# Patient Record
Sex: MALE | Race: WHITE | NOT HISPANIC OR LATINO | Employment: OTHER | URBAN - METROPOLITAN AREA
[De-identification: names, ages, dates, MRNs, and addresses within clinical notes are randomized per-mention and may not be internally consistent; named-entity substitution may affect disease eponyms.]

---

## 2018-01-15 ENCOUNTER — COMMUNICATION - HEALTHEAST (OUTPATIENT)
Dept: TELEHEALTH | Facility: CLINIC | Age: 38
End: 2018-01-15

## 2018-01-15 ENCOUNTER — OFFICE VISIT - HEALTHEAST (OUTPATIENT)
Dept: FAMILY MEDICINE | Facility: CLINIC | Age: 38
End: 2018-01-15

## 2018-01-15 DIAGNOSIS — S06.0X0A CONCUSSION WITHOUT LOSS OF CONSCIOUSNESS, INITIAL ENCOUNTER: ICD-10-CM

## 2018-01-15 ASSESSMENT — MIFFLIN-ST. JEOR: SCORE: 1805.33

## 2018-03-30 ENCOUNTER — OFFICE VISIT - HEALTHEAST (OUTPATIENT)
Dept: FAMILY MEDICINE | Facility: CLINIC | Age: 38
End: 2018-03-30

## 2018-03-30 DIAGNOSIS — F41.9 ANXIETY DISORDER, UNSPECIFIED TYPE: ICD-10-CM

## 2018-03-30 DIAGNOSIS — F10.29 ALCOHOL DEPENDENCE WITH UNSPECIFIED ALCOHOL-INDUCED DISORDER (H): ICD-10-CM

## 2018-03-30 LAB
ALBUMIN SERPL-MCNC: 4.2 G/DL (ref 3.5–5)
ALP SERPL-CCNC: 47 U/L (ref 45–120)
ALT SERPL W P-5'-P-CCNC: 57 U/L (ref 0–45)
AST SERPL W P-5'-P-CCNC: 35 U/L (ref 0–40)
BILIRUB DIRECT SERPL-MCNC: 0.2 MG/DL
BILIRUB SERPL-MCNC: 0.6 MG/DL (ref 0–1)
PROT SERPL-MCNC: 7.3 G/DL (ref 6–8)

## 2018-04-03 ENCOUNTER — COMMUNICATION - HEALTHEAST (OUTPATIENT)
Dept: FAMILY MEDICINE | Facility: CLINIC | Age: 38
End: 2018-04-03

## 2018-04-11 ENCOUNTER — HOSPITAL ENCOUNTER (OUTPATIENT)
Dept: BEHAVIORAL HEALTH | Facility: CLINIC | Age: 38
Discharge: HOME | End: 2018-04-11
Attending: SOCIAL WORKER | Admitting: SOCIAL WORKER
Payer: COMMERCIAL

## 2018-04-11 VITALS
HEART RATE: 56 BPM | HEIGHT: 71 IN | SYSTOLIC BLOOD PRESSURE: 148 MMHG | BODY MASS INDEX: 26.01 KG/M2 | WEIGHT: 185.8 LBS | DIASTOLIC BLOOD PRESSURE: 90 MMHG

## 2018-04-11 PROCEDURE — H0001 ALCOHOL AND/OR DRUG ASSESS: HCPCS

## 2018-04-11 RX ORDER — CITALOPRAM HYDROBROMIDE 10 MG/1
TABLET ORAL
COMMUNITY
Start: 2018-03-30 | End: 2021-11-03

## 2018-04-11 ASSESSMENT — ANXIETY QUESTIONNAIRES
5. BEING SO RESTLESS THAT IT IS HARD TO SIT STILL: NOT AT ALL
GAD7 TOTAL SCORE: 2
1. FEELING NERVOUS, ANXIOUS, OR ON EDGE: SEVERAL DAYS
4. TROUBLE RELAXING: NOT AT ALL
2. NOT BEING ABLE TO STOP OR CONTROL WORRYING: NOT AT ALL
7. FEELING AFRAID AS IF SOMETHING AWFUL MIGHT HAPPEN: NOT AT ALL
3. WORRYING TOO MUCH ABOUT DIFFERENT THINGS: NOT AT ALL
6. BECOMING EASILY ANNOYED OR IRRITABLE: SEVERAL DAYS

## 2018-04-11 ASSESSMENT — PAIN SCALES - GENERAL: PAINLEVEL: NO PAIN (0)

## 2018-04-12 ASSESSMENT — ANXIETY QUESTIONNAIRES: GAD7 TOTAL SCORE: 2

## 2018-04-12 ASSESSMENT — PATIENT HEALTH QUESTIONNAIRE - PHQ9: SUM OF ALL RESPONSES TO PHQ QUESTIONS 1-9: 2

## 2018-04-30 ENCOUNTER — COMMUNICATION - HEALTHEAST (OUTPATIENT)
Dept: FAMILY MEDICINE | Facility: CLINIC | Age: 38
End: 2018-04-30

## 2018-04-30 DIAGNOSIS — F41.9 ANXIETY DISORDER, UNSPECIFIED TYPE: ICD-10-CM

## 2018-05-04 ENCOUNTER — OFFICE VISIT - HEALTHEAST (OUTPATIENT)
Dept: FAMILY MEDICINE | Facility: CLINIC | Age: 38
End: 2018-05-04

## 2018-05-04 DIAGNOSIS — F41.9 ANXIETY DISORDER, UNSPECIFIED TYPE: ICD-10-CM

## 2018-05-04 DIAGNOSIS — F10.29 ALCOHOL DEPENDENCE WITH UNSPECIFIED ALCOHOL-INDUCED DISORDER (H): ICD-10-CM

## 2018-09-14 ENCOUNTER — AMBULATORY - HEALTHEAST (OUTPATIENT)
Dept: LAB | Facility: CLINIC | Age: 38
End: 2018-09-14

## 2018-09-14 ENCOUNTER — AMBULATORY - HEALTHEAST (OUTPATIENT)
Dept: NURSING | Facility: CLINIC | Age: 38
End: 2018-09-14

## 2018-09-14 DIAGNOSIS — J30.2 SEASONAL ALLERGIC RHINITIS: ICD-10-CM

## 2018-09-14 DIAGNOSIS — Z11.3 SCREENING EXAMINATION FOR VENEREAL DISEASE: ICD-10-CM

## 2018-09-14 LAB
HBV CORE AB SERPL QL IA: NEGATIVE
HCV AB SERPL QL IA: NEGATIVE
HIV 1+2 AB+HIV1 P24 AG SERPL QL IA: NEGATIVE

## 2018-09-15 LAB — T PALLIDUM AB SER QL: NEGATIVE

## 2018-09-17 LAB — HEPATITIS B SURFACE ANTIBODY LHE- HISTORICAL: POSITIVE

## 2018-10-31 ENCOUNTER — OFFICE VISIT - HEALTHEAST (OUTPATIENT)
Dept: ADDICTION MEDICINE | Facility: CLINIC | Age: 38
End: 2018-10-31

## 2018-10-31 DIAGNOSIS — F10.20 MODERATE ALCOHOL USE DISORDER (H): ICD-10-CM

## 2018-11-14 ENCOUNTER — OFFICE VISIT - HEALTHEAST (OUTPATIENT)
Dept: ADDICTION MEDICINE | Facility: CLINIC | Age: 38
End: 2018-11-14

## 2018-11-14 DIAGNOSIS — F10.20 MODERATE ALCOHOL USE DISORDER (H): ICD-10-CM

## 2018-11-15 ENCOUNTER — OFFICE VISIT - HEALTHEAST (OUTPATIENT)
Dept: ADDICTION MEDICINE | Facility: CLINIC | Age: 38
End: 2018-11-15

## 2018-11-15 ENCOUNTER — AMBULATORY - HEALTHEAST (OUTPATIENT)
Dept: ADDICTION MEDICINE | Facility: CLINIC | Age: 38
End: 2018-11-15

## 2018-11-15 DIAGNOSIS — F10.20 MODERATE ALCOHOL USE DISORDER (H): ICD-10-CM

## 2018-11-16 ENCOUNTER — AMBULATORY - HEALTHEAST (OUTPATIENT)
Dept: ADDICTION MEDICINE | Facility: CLINIC | Age: 38
End: 2018-11-16

## 2018-11-19 ENCOUNTER — OFFICE VISIT - HEALTHEAST (OUTPATIENT)
Dept: ADDICTION MEDICINE | Facility: CLINIC | Age: 38
End: 2018-11-19

## 2018-11-19 DIAGNOSIS — F10.20 MODERATE ALCOHOL USE DISORDER (H): ICD-10-CM

## 2018-11-21 ENCOUNTER — OFFICE VISIT - HEALTHEAST (OUTPATIENT)
Dept: ADDICTION MEDICINE | Facility: CLINIC | Age: 38
End: 2018-11-21

## 2018-11-21 DIAGNOSIS — F10.20 MODERATE ALCOHOL USE DISORDER (H): ICD-10-CM

## 2018-11-23 ENCOUNTER — AMBULATORY - HEALTHEAST (OUTPATIENT)
Dept: ADDICTION MEDICINE | Facility: CLINIC | Age: 38
End: 2018-11-23

## 2018-11-27 ENCOUNTER — OFFICE VISIT - HEALTHEAST (OUTPATIENT)
Dept: ADDICTION MEDICINE | Facility: CLINIC | Age: 38
End: 2018-11-27

## 2018-11-27 DIAGNOSIS — F10.20 MODERATE ALCOHOL USE DISORDER (H): ICD-10-CM

## 2018-11-28 ENCOUNTER — OFFICE VISIT - HEALTHEAST (OUTPATIENT)
Dept: ADDICTION MEDICINE | Facility: CLINIC | Age: 38
End: 2018-11-28

## 2018-11-28 DIAGNOSIS — F10.20 MODERATE ALCOHOL USE DISORDER (H): ICD-10-CM

## 2018-11-29 ENCOUNTER — OFFICE VISIT - HEALTHEAST (OUTPATIENT)
Dept: ADDICTION MEDICINE | Facility: CLINIC | Age: 38
End: 2018-11-29

## 2018-11-29 DIAGNOSIS — F10.20 MODERATE ALCOHOL USE DISORDER (H): ICD-10-CM

## 2018-12-02 ENCOUNTER — AMBULATORY - HEALTHEAST (OUTPATIENT)
Dept: ADDICTION MEDICINE | Facility: CLINIC | Age: 38
End: 2018-12-02

## 2018-12-04 ENCOUNTER — COMMUNICATION - HEALTHEAST (OUTPATIENT)
Dept: FAMILY MEDICINE | Facility: CLINIC | Age: 38
End: 2018-12-04

## 2018-12-04 ENCOUNTER — OFFICE VISIT - HEALTHEAST (OUTPATIENT)
Dept: ADDICTION MEDICINE | Facility: CLINIC | Age: 38
End: 2018-12-04

## 2018-12-04 DIAGNOSIS — F10.20 MODERATE ALCOHOL USE DISORDER (H): ICD-10-CM

## 2018-12-04 DIAGNOSIS — F10.29 ALCOHOL DEPENDENCE WITH UNSPECIFIED ALCOHOL-INDUCED DISORDER (H): ICD-10-CM

## 2018-12-05 ENCOUNTER — OFFICE VISIT - HEALTHEAST (OUTPATIENT)
Dept: ADDICTION MEDICINE | Facility: CLINIC | Age: 38
End: 2018-12-05

## 2018-12-05 DIAGNOSIS — F10.20 MODERATE ALCOHOL USE DISORDER (H): ICD-10-CM

## 2018-12-06 ENCOUNTER — OFFICE VISIT - HEALTHEAST (OUTPATIENT)
Dept: ADDICTION MEDICINE | Facility: CLINIC | Age: 38
End: 2018-12-06

## 2018-12-06 DIAGNOSIS — F10.20 MODERATE ALCOHOL USE DISORDER (H): ICD-10-CM

## 2018-12-07 ENCOUNTER — TELEPHONE (OUTPATIENT)
Dept: ADDICTION MEDICINE | Facility: CLINIC | Age: 38
End: 2018-12-07

## 2018-12-07 NOTE — TELEPHONE ENCOUNTER
Referral received via fax from [facility] [facility].  Please review referral and route back to reception pool # [id number].  Referral placed on Dr. Overton's desk to review.      Hola Ziegler

## 2018-12-09 ENCOUNTER — AMBULATORY - HEALTHEAST (OUTPATIENT)
Dept: ADDICTION MEDICINE | Facility: CLINIC | Age: 38
End: 2018-12-09

## 2018-12-11 ENCOUNTER — OFFICE VISIT - HEALTHEAST (OUTPATIENT)
Dept: ADDICTION MEDICINE | Facility: CLINIC | Age: 38
End: 2018-12-11

## 2018-12-11 DIAGNOSIS — F10.20 MODERATE ALCOHOL USE DISORDER (H): ICD-10-CM

## 2018-12-11 NOTE — TELEPHONE ENCOUNTER
Writer attempted to reach pt; no answer. LVM requesting a call back for an appt. Two more attempts will be made.      Hola Ziegler

## 2018-12-12 ENCOUNTER — OFFICE VISIT - HEALTHEAST (OUTPATIENT)
Dept: ADDICTION MEDICINE | Facility: CLINIC | Age: 38
End: 2018-12-12

## 2018-12-12 DIAGNOSIS — F10.20 MODERATE ALCOHOL USE DISORDER (H): ICD-10-CM

## 2018-12-13 ENCOUNTER — OFFICE VISIT - HEALTHEAST (OUTPATIENT)
Dept: ADDICTION MEDICINE | Facility: CLINIC | Age: 38
End: 2018-12-13

## 2018-12-13 DIAGNOSIS — F10.20 MODERATE ALCOHOL USE DISORDER (H): ICD-10-CM

## 2018-12-14 ENCOUNTER — AMBULATORY - HEALTHEAST (OUTPATIENT)
Dept: ADDICTION MEDICINE | Facility: CLINIC | Age: 38
End: 2018-12-14

## 2018-12-18 NOTE — TELEPHONE ENCOUNTER
Second attempt to reach pt; no answer. LVM requesting a call back for an appt. A final attempt will be made.     Hola Ziegler

## 2018-12-19 ENCOUNTER — COMMUNICATION - HEALTHEAST (OUTPATIENT)
Dept: ADDICTION MEDICINE | Facility: CLINIC | Age: 38
End: 2018-12-19

## 2018-12-20 ENCOUNTER — OFFICE VISIT - HEALTHEAST (OUTPATIENT)
Dept: ADDICTION MEDICINE | Facility: CLINIC | Age: 38
End: 2018-12-20

## 2018-12-20 DIAGNOSIS — F10.20 MODERATE ALCOHOL USE DISORDER (H): ICD-10-CM

## 2018-12-21 ENCOUNTER — OFFICE VISIT - HEALTHEAST (OUTPATIENT)
Dept: FAMILY MEDICINE | Facility: CLINIC | Age: 38
End: 2018-12-21

## 2018-12-21 ENCOUNTER — OFFICE VISIT - HEALTHEAST (OUTPATIENT)
Dept: BEHAVIORAL HEALTH | Facility: CLINIC | Age: 38
End: 2018-12-21

## 2018-12-21 DIAGNOSIS — F10.29 ALCOHOL DEPENDENCE WITH UNSPECIFIED ALCOHOL-INDUCED DISORDER (H): ICD-10-CM

## 2018-12-21 DIAGNOSIS — F43.22 ADJUSTMENT DISORDER WITH ANXIOUS MOOD: ICD-10-CM

## 2018-12-21 DIAGNOSIS — F41.9 ANXIETY DISORDER, UNSPECIFIED TYPE: ICD-10-CM

## 2018-12-21 LAB
ALBUMIN SERPL-MCNC: 4.2 G/DL (ref 3.5–5)
ALP SERPL-CCNC: 43 U/L (ref 45–120)
ALT SERPL W P-5'-P-CCNC: 44 U/L (ref 0–45)
AST SERPL W P-5'-P-CCNC: 29 U/L (ref 0–40)
BILIRUB DIRECT SERPL-MCNC: 0.2 MG/DL
BILIRUB SERPL-MCNC: 0.5 MG/DL (ref 0–1)
PROT SERPL-MCNC: 7.2 G/DL (ref 6–8)

## 2018-12-26 ENCOUNTER — OFFICE VISIT - HEALTHEAST (OUTPATIENT)
Dept: ADDICTION MEDICINE | Facility: CLINIC | Age: 38
End: 2018-12-26

## 2018-12-26 DIAGNOSIS — F10.20 MODERATE ALCOHOL USE DISORDER (H): ICD-10-CM

## 2018-12-27 ENCOUNTER — OFFICE VISIT - HEALTHEAST (OUTPATIENT)
Dept: ADDICTION MEDICINE | Facility: CLINIC | Age: 38
End: 2018-12-27

## 2018-12-27 DIAGNOSIS — F10.20 MODERATE ALCOHOL USE DISORDER (H): ICD-10-CM

## 2018-12-31 ENCOUNTER — OFFICE VISIT - HEALTHEAST (OUTPATIENT)
Dept: ADDICTION MEDICINE | Facility: CLINIC | Age: 38
End: 2018-12-31

## 2018-12-31 ENCOUNTER — COMMUNICATION - HEALTHEAST (OUTPATIENT)
Dept: ADDICTION MEDICINE | Facility: CLINIC | Age: 38
End: 2018-12-31

## 2018-12-31 DIAGNOSIS — F10.20 MODERATE ALCOHOL USE DISORDER (H): ICD-10-CM

## 2019-01-02 ENCOUNTER — COMMUNICATION - HEALTHEAST (OUTPATIENT)
Dept: ADDICTION MEDICINE | Facility: CLINIC | Age: 39
End: 2019-01-02

## 2019-01-02 ENCOUNTER — OFFICE VISIT - HEALTHEAST (OUTPATIENT)
Dept: ADDICTION MEDICINE | Facility: CLINIC | Age: 39
End: 2019-01-02

## 2019-01-02 DIAGNOSIS — F10.20 MODERATE ALCOHOL USE DISORDER (H): ICD-10-CM

## 2019-01-03 ENCOUNTER — OFFICE VISIT - HEALTHEAST (OUTPATIENT)
Dept: ADDICTION MEDICINE | Facility: CLINIC | Age: 39
End: 2019-01-03

## 2019-01-03 DIAGNOSIS — F10.20 MODERATE ALCOHOL USE DISORDER (H): ICD-10-CM

## 2019-01-04 ENCOUNTER — AMBULATORY - HEALTHEAST (OUTPATIENT)
Dept: ADDICTION MEDICINE | Facility: CLINIC | Age: 39
End: 2019-01-04

## 2019-01-05 ENCOUNTER — AMBULATORY - HEALTHEAST (OUTPATIENT)
Dept: ADDICTION MEDICINE | Facility: CLINIC | Age: 39
End: 2019-01-05

## 2019-01-07 ENCOUNTER — COMMUNICATION - HEALTHEAST (OUTPATIENT)
Dept: ADDICTION MEDICINE | Facility: CLINIC | Age: 39
End: 2019-01-07

## 2019-01-08 ENCOUNTER — AMBULATORY - HEALTHEAST (OUTPATIENT)
Dept: ADDICTION MEDICINE | Facility: CLINIC | Age: 39
End: 2019-01-08

## 2019-04-26 ENCOUNTER — COMMUNICATION - HEALTHEAST (OUTPATIENT)
Dept: FAMILY MEDICINE | Facility: CLINIC | Age: 39
End: 2019-04-26

## 2019-04-26 DIAGNOSIS — F10.29 ALCOHOL DEPENDENCE WITH UNSPECIFIED ALCOHOL-INDUCED DISORDER (H): ICD-10-CM

## 2019-05-03 ENCOUNTER — OFFICE VISIT - HEALTHEAST (OUTPATIENT)
Dept: FAMILY MEDICINE | Facility: CLINIC | Age: 39
End: 2019-05-03

## 2019-05-03 DIAGNOSIS — F10.29 ALCOHOL DEPENDENCE WITH UNSPECIFIED ALCOHOL-INDUCED DISORDER (H): ICD-10-CM

## 2019-05-03 DIAGNOSIS — F41.9 ANXIETY DISORDER, UNSPECIFIED TYPE: ICD-10-CM

## 2019-05-03 LAB
ALBUMIN SERPL-MCNC: 4.3 G/DL (ref 3.5–5)
ALP SERPL-CCNC: 61 U/L (ref 45–120)
ALT SERPL W P-5'-P-CCNC: 45 U/L (ref 0–45)
AST SERPL W P-5'-P-CCNC: 24 U/L (ref 0–40)
BILIRUB DIRECT SERPL-MCNC: <0.1 MG/DL
BILIRUB SERPL-MCNC: 0.4 MG/DL (ref 0–1)
PROT SERPL-MCNC: 7.3 G/DL (ref 6–8)

## 2019-07-03 ENCOUNTER — AMBULATORY - HEALTHEAST (OUTPATIENT)
Dept: LAB | Facility: CLINIC | Age: 39
End: 2019-07-03

## 2019-07-03 DIAGNOSIS — Z11.3 SCREEN FOR SEXUALLY TRANSMITTED DISEASES: ICD-10-CM

## 2019-07-03 LAB — HIV 1+2 AB+HIV1 P24 AG SERPL QL IA: NEGATIVE

## 2019-07-04 LAB
HBV SURFACE AG SERPL QL IA: NEGATIVE
HCV AB SERPL QL IA: NEGATIVE
T PALLIDUM AB SER QL: NEGATIVE

## 2019-07-05 LAB — HBV CORE AB SERPL QL IA: NEGATIVE

## 2019-07-08 LAB — HTLV I+II AB SER QL IA: NEGATIVE

## 2019-12-05 ENCOUNTER — COMMUNICATION - HEALTHEAST (OUTPATIENT)
Dept: FAMILY MEDICINE | Facility: CLINIC | Age: 39
End: 2019-12-05

## 2019-12-05 DIAGNOSIS — F41.9 ANXIETY DISORDER, UNSPECIFIED TYPE: ICD-10-CM

## 2020-06-22 ENCOUNTER — OFFICE VISIT - HEALTHEAST (OUTPATIENT)
Dept: FAMILY MEDICINE | Facility: CLINIC | Age: 40
End: 2020-06-22

## 2020-06-22 ENCOUNTER — COMMUNICATION - HEALTHEAST (OUTPATIENT)
Dept: PEDIATRICS | Facility: CLINIC | Age: 40
End: 2020-06-22

## 2020-06-22 DIAGNOSIS — F41.9 ANXIETY DISORDER, UNSPECIFIED TYPE: ICD-10-CM

## 2020-06-22 ASSESSMENT — PATIENT HEALTH QUESTIONNAIRE - PHQ9: SUM OF ALL RESPONSES TO PHQ QUESTIONS 1-9: 2

## 2020-09-22 ENCOUNTER — AMBULATORY - HEALTHEAST (OUTPATIENT)
Dept: NURSING | Facility: CLINIC | Age: 40
End: 2020-09-22

## 2020-09-22 DIAGNOSIS — Z23 NEED FOR VACCINATION: ICD-10-CM

## 2020-10-22 ENCOUNTER — OFFICE VISIT - HEALTHEAST (OUTPATIENT)
Dept: FAMILY MEDICINE | Facility: CLINIC | Age: 40
End: 2020-10-22

## 2020-10-22 DIAGNOSIS — R41.840 ATTENTION AND CONCENTRATION DEFICIT: ICD-10-CM

## 2020-10-26 ENCOUNTER — OFFICE VISIT - HEALTHEAST (OUTPATIENT)
Dept: INTERNAL MEDICINE | Facility: CLINIC | Age: 40
End: 2020-10-26

## 2020-10-26 ENCOUNTER — COMMUNICATION - HEALTHEAST (OUTPATIENT)
Dept: INTERNAL MEDICINE | Facility: CLINIC | Age: 40
End: 2020-10-26

## 2020-10-26 DIAGNOSIS — Z76.89 ENCOUNTER TO ESTABLISH CARE: ICD-10-CM

## 2020-10-26 DIAGNOSIS — F90.9 ATTENTION DEFICIT HYPERACTIVITY DISORDER (ADHD), UNSPECIFIED ADHD TYPE: ICD-10-CM

## 2020-10-26 DIAGNOSIS — F41.9 ANXIETY: ICD-10-CM

## 2020-10-26 ASSESSMENT — MIFFLIN-ST. JEOR: SCORE: 1746.36

## 2020-12-19 ENCOUNTER — COMMUNICATION - HEALTHEAST (OUTPATIENT)
Dept: FAMILY MEDICINE | Facility: CLINIC | Age: 40
End: 2020-12-19

## 2020-12-19 DIAGNOSIS — F41.9 ANXIETY DISORDER, UNSPECIFIED TYPE: ICD-10-CM

## 2021-01-08 ENCOUNTER — COMMUNICATION - HEALTHEAST (OUTPATIENT)
Dept: INTERNAL MEDICINE | Facility: CLINIC | Age: 41
End: 2021-01-08

## 2021-01-13 ENCOUNTER — OFFICE VISIT - HEALTHEAST (OUTPATIENT)
Dept: INTERNAL MEDICINE | Facility: CLINIC | Age: 41
End: 2021-01-13

## 2021-01-13 DIAGNOSIS — F90.2 ATTENTION DEFICIT HYPERACTIVITY DISORDER (ADHD), COMBINED TYPE: ICD-10-CM

## 2021-01-25 ENCOUNTER — COMMUNICATION - HEALTHEAST (OUTPATIENT)
Dept: INTERNAL MEDICINE | Facility: CLINIC | Age: 41
End: 2021-01-25

## 2021-01-28 ENCOUNTER — AMBULATORY - HEALTHEAST (OUTPATIENT)
Dept: NURSING | Facility: CLINIC | Age: 41
End: 2021-01-28

## 2021-02-08 ENCOUNTER — COMMUNICATION - HEALTHEAST (OUTPATIENT)
Dept: INTERNAL MEDICINE | Facility: CLINIC | Age: 41
End: 2021-02-08

## 2021-02-10 ENCOUNTER — OFFICE VISIT - HEALTHEAST (OUTPATIENT)
Dept: INTERNAL MEDICINE | Facility: CLINIC | Age: 41
End: 2021-02-10

## 2021-02-10 DIAGNOSIS — F90.2 ATTENTION DEFICIT HYPERACTIVITY DISORDER (ADHD), COMBINED TYPE: ICD-10-CM

## 2021-02-11 ENCOUNTER — COMMUNICATION - HEALTHEAST (OUTPATIENT)
Dept: INTERNAL MEDICINE | Facility: CLINIC | Age: 41
End: 2021-02-11

## 2021-02-11 DIAGNOSIS — F90.2 ATTENTION DEFICIT HYPERACTIVITY DISORDER (ADHD), COMBINED TYPE: ICD-10-CM

## 2021-02-18 ENCOUNTER — AMBULATORY - HEALTHEAST (OUTPATIENT)
Dept: NURSING | Facility: CLINIC | Age: 41
End: 2021-02-18

## 2021-03-10 ENCOUNTER — COMMUNICATION - HEALTHEAST (OUTPATIENT)
Dept: INTERNAL MEDICINE | Facility: CLINIC | Age: 41
End: 2021-03-10

## 2021-03-10 DIAGNOSIS — F90.2 ATTENTION DEFICIT HYPERACTIVITY DISORDER (ADHD), COMBINED TYPE: ICD-10-CM

## 2021-04-02 ENCOUNTER — COMMUNICATION - HEALTHEAST (OUTPATIENT)
Dept: INTERNAL MEDICINE | Facility: CLINIC | Age: 41
End: 2021-04-02

## 2021-04-02 DIAGNOSIS — F90.2 ATTENTION DEFICIT HYPERACTIVITY DISORDER (ADHD), COMBINED TYPE: ICD-10-CM

## 2021-05-03 ENCOUNTER — COMMUNICATION - HEALTHEAST (OUTPATIENT)
Dept: INTERNAL MEDICINE | Facility: CLINIC | Age: 41
End: 2021-05-03

## 2021-05-03 DIAGNOSIS — F90.2 ATTENTION DEFICIT HYPERACTIVITY DISORDER (ADHD), COMBINED TYPE: ICD-10-CM

## 2021-05-27 ASSESSMENT — PATIENT HEALTH QUESTIONNAIRE - PHQ9: SUM OF ALL RESPONSES TO PHQ QUESTIONS 1-9: 2

## 2021-05-28 NOTE — TELEPHONE ENCOUNTER
RN cannot approve Refill Request    RN can NOT refill this medication med is not covered by policy/route to provider. Last office visit: 12/21/2018 Peña Herrera MD Last Physical: Visit date not found Last MTM visit: Visit date not found Last visit same specialty: 12/21/2018 Peña Herrera MD.  Next visit within 3 mo: Visit date not found  Next physical within 3 mo: Visit date not found      Clover Turner, [facility] Triage/Med Refill 4/27/2019    Requested Prescriptions   Pending Prescriptions Disp Refills     naltrexone (DEPADE) 50 mg tablet 90 tablet 0     Sig: Take 1 tablet (50 mg total) by mouth daily.       There is no refill protocol information for this order

## 2021-05-28 NOTE — PROGRESS NOTES
ASSESSMENT:  1. Alcohol dependence with unspecified alcohol-induced disorder (H)  - Hepatic Profile  I congratulated him for been sober for 118 days currently.  Encouraged to continue to do the AA meeting, also encouraged to become a sponsor so that he can have some responsibility not just what is himself but was somebody else as that will be helpful.  We did put in for liver function to check his liver enzymes.  He will continue with his medications at this time.  We will refill his naltrexone as well citalopram whenever he is due.  His questions were answered today follow-up will be in about 3 months I also encouraged him to come in for a physical exam fasted.  2. Anxiety disorder, unspecified type      PLAN:  There are no Patient Instructions on file for this visit.    Orders Placed This Encounter   Procedures     Hepatic Profile     Medications Discontinued During This Encounter   Medication Reason     LORazepam (ATIVAN) 0.5 MG tablet Therapy completed       No follow-ups on file.      CHIEF COMPLAINT:  Chief Complaint   Patient presents with     Medication Management       HISTORY OF PRESENT ILLNESS:  Herminio is a 38 y.o. male presenting to the clinic today for follow-up as well as medication management.  He has had a history of alcohol abuse and had initially been on outpatient treatment.  Noted that outpatient treatment was not very effective since he still was able to drink intermittently.  This made it necessary that he had gone in for in-house treatment for about a month.  He noted that this has been quite successful and he had been sober for about 118 days now since this year.  He noted having no urge or craving for alcohol, noted being able to think and meditate about alcohol use and that has been quite helpful for him.  He is being a lot focused on himself and advantages right now.  He is having much better relationship with his family.  He did recently lose his job but had actually gotten another one  and is very happy with it.  He continues to do his AA.  Today he comes in for medication check.  He actually does not need any refills but also wanted to see about checking his liver function.  He was to continue with his naltrexone as it is known to be much more helpful if taken for a longer period of time.    REVIEW OF SYSTEMS:   A full review of system was done and is essentially negative except as stated.  All other systems are negative.    PFSH:  Reviewed, as below.    Social History     Tobacco Use   Smoking Status Never Smoker   Smokeless Tobacco Never Used       Family History   Problem Relation Age of Onset     Hypertension Mother      Hypertension Brother        Social History     Socioeconomic History     Marital status: Single     Spouse name: Not on file     Number of children: Not on file     Years of education: Not on file     Highest education level: Not on file   Occupational History     Not on file   Social Needs     Financial resource strain: Not on file     Food insecurity:     Worry: Not on file     Inability: Not on file     Transportation needs:     Medical: Not on file     Non-medical: Not on file   Tobacco Use     Smoking status: Never Smoker     Smokeless tobacco: Never Used   Substance and Sexual Activity     Alcohol use: Yes     Alcohol/week: 2.4 oz     Types: 4 Cans of beer per week     Drug use: Not on file     Sexual activity: Not on file   Lifestyle     Physical activity:     Days per week: Not on file     Minutes per session: Not on file     Stress: Not on file   Relationships     Social connections:     Talks on phone: Not on file     Gets together: Not on file     Attends [employer] service: Not on file     Active member of club or organization: Not on file     Attends meetings of clubs or organizations: Not on file     Relationship status: Not on file     Intimate partner violence:     Fear of current or ex partner: Not on file     Emotionally abused: Not on file     Physically  abused: Not on file     Forced sexual activity: Not on file   Other Topics Concern     Not on file   Social History Narrative     Not on file       No past surgical history on file.    No Known Allergies    Active Ambulatory Problems     Diagnosis Date Noted     Mixed hyperlipidemia 04/12/2007     Resolved Ambulatory Problems     Diagnosis Date Noted     No Resolved Ambulatory Problems     Past Medical History:   Diagnosis Date     Concussion 01/2018       Current Outpatient Medications   Medication Sig Dispense Refill     acetaminophen (TYLENOL) 500 MG tablet Take 500 mg by mouth every 6 (six) hours as needed for pain.       citalopram (CELEXA) 20 MG tablet Take 1 tablet (20 mg total) by mouth daily. 90 tablet 2     IBUPROFEN ORAL Take by mouth.       MULTIVITAMIN (MULTIPLE VITAMINS ORAL) Take by mouth.       naltrexone (DEPADE) 50 mg tablet Take 1 tablet (50 mg total) by mouth daily. 90 tablet 0     No current facility-administered medications for this visit.        VITALS:  Vitals:    05/03/19 1256   BP: 118/78   Pulse: 72   Resp: 16   Weight: 180 lb 8 oz (81.9 kg)     Wt Readings from Last 3 Encounters:   05/03/19 180 lb 8 oz (81.9 kg)   12/21/18 183 lb (83 kg)   05/04/18 180 lb 11.2 oz (82 kg)     Body mass index is 25.17 kg/m .    PHYSICAL EXAM:  General Appearance: Alert, cooperative, no distress, appears stated age.  He does appear to be very content and happy, has good eye contact.  HEENT: Pupils are equal and reactive, extraocular motions is normal.  Neck is supple no, no murmur, rub, or gallop notable thyromegaly.  External ears are normal.  Lungs: Clear to auscultation bilaterally, respirations unlabored  Heart: Regular rate and rhythm, S1 and S2 normal  Abdomen: Soft, no tenderness.  No palpable masses.  Musculoskeletal: Normal range of motion. No joint swelling or deformity.   Neurologic:  Alert and oriented times 3. Cranial nerves II-XII intact.   Psychiatric: Normal mood and  affect.    MEDICATIONS:  Current Outpatient Medications   Medication Sig Dispense Refill     acetaminophen (TYLENOL) 500 MG tablet Take 500 mg by mouth every 6 (six) hours as needed for pain.       citalopram (CELEXA) 20 MG tablet Take 1 tablet (20 mg total) by mouth daily. 90 tablet 2     IBUPROFEN ORAL Take by mouth.       MULTIVITAMIN (MULTIPLE VITAMINS ORAL) Take by mouth.       naltrexone (DEPADE) 50 mg tablet Take 1 tablet (50 mg total) by mouth daily. 90 tablet 0     No current facility-administered medications for this visit.

## 2021-05-31 VITALS — WEIGHT: 190.3 LBS | BODY MASS INDEX: 26.64 KG/M2 | HEIGHT: 71 IN

## 2021-06-01 VITALS — BODY MASS INDEX: 25.93 KG/M2 | WEIGHT: 185.9 LBS

## 2021-06-01 VITALS — WEIGHT: 180.7 LBS | BODY MASS INDEX: 25.2 KG/M2

## 2021-06-02 VITALS — WEIGHT: 183 LBS | BODY MASS INDEX: 25.52 KG/M2

## 2021-06-03 ENCOUNTER — COMMUNICATION - HEALTHEAST (OUTPATIENT)
Dept: INTERNAL MEDICINE | Facility: CLINIC | Age: 41
End: 2021-06-03

## 2021-06-03 VITALS — WEIGHT: 180.5 LBS | BODY MASS INDEX: 25.17 KG/M2

## 2021-06-03 DIAGNOSIS — F90.2 ATTENTION DEFICIT HYPERACTIVITY DISORDER (ADHD), COMBINED TYPE: ICD-10-CM

## 2021-06-04 NOTE — TELEPHONE ENCOUNTER
Refill Approved    Rx renewed per Medication Renewal Policy. Medication was last renewed on 12/21/18.    Susan Purcell, [facility] Triage/Med Refill 12/6/2019     Requested Prescriptions   Pending Prescriptions Disp Refills     citalopram (CELEXA) 20 MG tablet [Pharmacy Med Name: CITALOPRAM 20MG TABLETS] 90 tablet 0     Sig: TAKE 1 TABLET(20 MG) BY MOUTH DAILY       SSRI Refill Protocol  Passed - 12/5/2019 11:29 AM        Passed - PCP or prescribing provider visit in last year     Last office visit with prescriber/PCP: 5/3/2019 Peña Herrera MD OR same dept: 5/3/2019 Peña Herrera MD OR same specialty: 5/3/2019 Peña Herrera MD  Last physical: Visit date not found Last MTM visit: Visit date not found   Next visit within 3 mo: Visit date not found  Next physical within 3 mo: Visit date not found  Prescriber OR PCP: Peña Herrera MD  Last diagnosis associated with med order: 1. Anxiety disorder, unspecified type  - citalopram (CELEXA) 20 MG tablet [Pharmacy Med Name: CITALOPRAM 20MG TABLETS]; TAKE 1 TABLET(20 MG) BY MOUTH DAILY  Dispense: 90 tablet; Refill: 0    If protocol passes may refill for 12 months if within 3 months of last provider visit (or a total of 15 months).

## 2021-06-05 VITALS
HEART RATE: 78 BPM | SYSTOLIC BLOOD PRESSURE: 116 MMHG | DIASTOLIC BLOOD PRESSURE: 76 MMHG | BODY MASS INDEX: 24.82 KG/M2 | OXYGEN SATURATION: 98 % | HEIGHT: 71 IN | WEIGHT: 177.3 LBS

## 2021-06-05 VITALS
DIASTOLIC BLOOD PRESSURE: 82 MMHG | HEART RATE: 76 BPM | OXYGEN SATURATION: 98 % | BODY MASS INDEX: 25.52 KG/M2 | SYSTOLIC BLOOD PRESSURE: 130 MMHG | WEIGHT: 183 LBS

## 2021-06-09 NOTE — TELEPHONE ENCOUNTER
Refill request for medication: citalopram Hydrobromide 20 mg   Last visit addressing this medication: 5/3/19  Follow up plan 12  months  Last refill on 12/6/19, quantity #90     Appointment: Left message for patient     Zina Fallon CMA

## 2021-06-09 NOTE — PATIENT INSTRUCTIONS - HE
Continue on citalopram as prescribed.    We could always increase the medication if needed if you were to feel like your anxiety were not well controlled.    We could always try weaning off of the medication in the future if you would like.

## 2021-06-09 NOTE — PROGRESS NOTES
"Herminio Gloria is a 39 y.o. male who is being evaluated via a billable telephone visit.      The patient has been notified of following:     \"This telephone visit will be conducted via a call between you and your physician/provider. We have found that certain health care needs can be provided without the need for a physical exam.  This service lets us provide the care you need with a short phone conversation.  If a prescription is necessary we can send it directly to your pharmacy.  If lab work is needed we can place an order for that and you can then stop by our lab to have the test done at a later time.    Telephone visits are billed at different rates depending on your insurance coverage. During this emergency period, for some insurers they may be billed the same as an in-person visit.  Please reach out to your insurance provider with any questions.    If during the course of the call the physician/provider feels a telephone visit is not appropriate, you will not be charged for this service.\"    Patient has given verbal consent to a Telephone visit? Yes    What phone number would you like to be contacted at? [phone]    Patient would like to receive their AVS by AVS Preference: Corky.    Clinic Note    Assessment:     Assessment and Plan:  1. Anxiety disorder  - citalopram (CELEXA) 20 MG tablet; Take 1 tablet (20 mg total) by mouth daily.  Dispense: 90 tablet; Refill: 1       Patient Instructions   Continue on citalopram as prescribed.    We could always increase the medication if needed if you were to feel like your anxiety were not well controlled.    We could always try weaning off of the medication in the future if you would like.           Subjective:      Herminio Gloria is a 39 y.o. male who seeks telephone visit consultation today with regard to medication refill.    He takes citalopram 20 mg daily for situational anxiety.  He has taken it for years.  Previously, he treated his anxiety with " alcohol but has been sober now for about a year and a half.    He feels like his anxiety is well controlled currently.    The following portions of the patient's history were reviewed and updated as appropriate: Allergies, medications, problem list, prior note.     Review of Systems:    Review is otherwise negative except for what is mentioned above.     Social Hx:    Social History     Tobacco Use   Smoking Status Never Smoker   Smokeless Tobacco Never Used         Objective:   There were no vitals filed for this visit.    Exam:    General: No apparent distress. Calm. Alert and Oriented X3. Pt behavior is appropriate.  Head:Atraumatic. Normocephalic, non-tender to palpation  Neck: Supple. No JVD. Full ROM. No adenopathy  Eyes: PERRL, No discharge. No strabismus. No nystagmus.  Ears: TMs pearly gray with landmarks visible.   Nose/Mouth/Throat: Patent nares, no oral lesions, pharynx clear and without exudate. Uvula mid-line. Nasal septum mid-line. Clear turbinates.   Lymph: No axillar or cervical adenopathy.   Chest/Lungs: Normal chest wall, clear to auscultation, normal respiratory effort and rate.   Heart/Pulses: Regular rate and rhythm, strong and equal radial pulses, no murmurs. Capillary refill <2 seconds. No edema.   Abdomen: Soft, no palpable masses. No hepatosplenomegaly, no tenderness with palpation noted. Bowel sounds active in all quadrants. No increased tympany.   Genitalia: Not examined.   Musculoskeletal: No CVA tenderness with palpation. Good ROM with extremities.   Neurologic: Interactive, alert, no focal findings, CNs intact.   Skin: Warm, dry. Normal hair pattern. Free of lesions. Normal skin turgor.       Patient Active Problem List   Diagnosis     Mixed hyperlipidemia     Current Outpatient Medications   Medication Sig Dispense Refill     acetaminophen (TYLENOL) 500 MG tablet Take 500 mg by mouth every 6 (six) hours as needed for pain.       citalopram (CELEXA) 20 MG tablet Take 1 tablet (20 mg  total) by mouth daily. 90 tablet 1     IBUPROFEN ORAL Take by mouth.       MULTIVITAMIN (MULTIPLE VITAMINS ORAL) Take by mouth.       naltrexone (DEPADE) 50 mg tablet Take 1 tablet (50 mg total) by mouth daily. 90 tablet 0     No current facility-administered medications for this visit.          Frank So CNP (Rob)    6/22/2020       Phone call duration:  10 minutes    Rosy Us MA

## 2021-06-12 NOTE — PROGRESS NOTES
"Herminio Gloria is a 40 y.o. male who is being evaluated via a billable telephone visit.      The patient has been notified of following:     \"This telephone visit will be conducted via a call between you and your physician/provider. We have found that certain health care needs can be provided without the need for a physical exam.  This service lets us provide the care you need with a short phone conversation.  If a prescription is necessary we can send it directly to your pharmacy.  If lab work is needed we can place an order for that and you can then stop by our lab to have the test done at a later time.    Telephone visits are billed at different rates depending on your insurance coverage. During this emergency period, for some insurers they may be billed the same as an in-person visit.  Please reach out to your insurance provider with any questions.    If during the course of the call the physician/provider feels a telephone visit is not appropriate, you will not be charged for this service.\"    Patient has given verbal consent to a Telephone visit? Yes    What phone number would you like to be contacted at? [phone]    Patient would like to receive their AVS by AVS Preference: Corky.    Patient seeks telephone visit consultation today with regard to an attention and concentration deficit that he has been experiencing over the last 30 years or so.    Patient is a recovered alcoholic and has been abstinent of alcohol for the last couple of years.    He has always struggled with attention and concentration issues, going back to his young childhood days.  He struggled in school.  He had a hard time with reading and comprehension.  In general, he was always told that he had difficulty with paying attention.    These problems extended through high school and eventually into college.  He started drinking in college and had a hard time attending classes on time.  He had difficulty getting his schoolwork done.  " He eventually had to see a counselor who referred him on to a mental health provider.  Apparently, that mental health provider told him that he was exhibiting ADD type tendencies and that he may benefit from therapy and medication.    He ultimately was able to get through school without medication or counseling but has continued to struggle with concentration issues.    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  The lack of structure makes it extremely difficult to get work done in a timely manner.  He has young children at home that take up a lot of his energy as well, making productivity an issue.    Projects often go unfinished.  Tasks often go uncompleted.    He is looking to establish with a new PCP.  He is open to the idea of a more formal ADD assessment.    Assessment/Plan:    1. Attention and concentration deficit  He was looking to establish care with me today.  Unfortunately, I told him that I do not see my own patients but that he would be a good fit for an internal medicine provider colleague. We will help facilitate that transition today.  I did want him to undergo a more formal assessment of his attention and concentration deficit.  To me, it does sound like he could benefit from something like Adderall, however, given his history of addiction it would be a good idea to get a formal assessment done before any medication is prescribed.      - AMB REFERRAL TO MENTAL HEALTH AND ADDICTION  - Adult (18+); Assessment and Testing; ADHD; [facility] [phone]; We will contact you to schedule the appointment or please call with any questions; External Referral        Phone call duration:  17 minutes

## 2021-06-13 NOTE — TELEPHONE ENCOUNTER
Refill Approved    Rx renewed per Medication Renewal Policy. Medication was last renewed on 6/22/20.    Susan Purcell, [facility] Triage/Med Refill 12/22/2020     Requested Prescriptions   Pending Prescriptions Disp Refills     citalopram (CELEXA) 20 MG tablet [Pharmacy Med Name: CITALOPRAM HYDROBROMIDE 20MG TABS] 90 tablet 0     Sig: TAKE ONE TABLET BY MOUTH ONCE DAILY       SSRI Refill Protocol  Passed - 12/19/2020 11:31 AM        Passed - PCP or prescribing provider visit in last year     Last office visit with prescriber/PCP: Visit date not found OR same dept: Visit date not found OR same specialty: 5/3/2019 Peña Herrera MD  Last physical: Visit date not found Last MTM visit: Visit date not found   Next visit within 3 mo: Visit date not found  Next physical within 3 mo: Visit date not found  Prescriber OR PCP: Frank So CNP  Last diagnosis associated with med order: 1. Anxiety disorder, unspecified type  - citalopram (CELEXA) 20 MG tablet [Pharmacy Med Name: CITALOPRAM HYDROBROMIDE 20MG TABS]; TAKE ONE TABLET BY MOUTH ONCE DAILY  Dispense: 90 tablet; Refill: 0    If protocol passes may refill for 12 months if within 3 months of last provider visit (or a total of 15 months).

## 2021-06-14 NOTE — PROGRESS NOTES
Clinic Note    Assessment:     Assessment and Plan:  1. Attention deficit hyperactivity disorder (ADHD), combined type: Diagnosed per evaluation by psychology. Will start on lowest dose of Adderall. Discussed side effects. He will follow up with provider as needed and discussed.   - dextroamphetamine-amphetamine (ADDERALL XR) 10 MG 24 hr capsule; Take 1 capsule (10 mg total) by mouth daily.  Dispense: 30 capsule; Refill: 0       Patient Instructions   Try the Adderall 10mg daily, if you feel like this is not strong enough after one week we can increase your dose if needed. Just send me a my chart message.     Good luck on selling the house!    I will plan on seeing you back if anything comes up.     Return if symptoms worsen or fail to improve.         Subjective:      Herminio Gloria is a 40 y.o. male presents today for follow up of his ADHD. He did have testing done through a psychologist and tested positive for ADHD.    He previously tried Adderall from a friend years back, and did well with this medication. He would like to try this again.    He reports that he and his wife are putting their house on the market, working on it currently, and living with his parents in [place]. This has been stressful, but he has been coping well.    He denies any other concerns today.     The following portions of the patient's history were reviewed and updated as appropriate.    Review of Systems:    Review is otherwise negative except for what is mentioned above.     Social Hx:    Social History     Tobacco Use   Smoking Status Never Smoker   Smokeless Tobacco Never Used         Objective:     Vitals:    01/13/21 1545   BP: 130/82   Pulse: 76   SpO2: 98%   Weight: 183 lb (83 kg)       Exam:    General: No apparent distress. Calm. Alert and Oriented X3. Pt behavior is appropriate.  Head:Atraumatic. Normocephalic.  Lungs: Clear to auscultation, normal respiratory effort and rate.   Heart: Regular rate and rhythm, no  murmurs, gallops, or rubs.   Musculoskeletal: Walks without difficulty.   Neurologic: Interactive, alert, no focal findings.  Skin: Warm, dry.       Patient Active Problem List   Diagnosis     Mixed hyperlipidemia     Current Outpatient Medications   Medication Sig Dispense Refill     acetaminophen (TYLENOL) 500 MG tablet Take 500 mg by mouth every 6 (six) hours as needed for pain.       citalopram (CELEXA) 20 MG tablet TAKE ONE TABLET BY MOUTH ONCE DAILY 90 tablet 3     IBUPROFEN ORAL Take by mouth.       dextroamphetamine-amphetamine (ADDERALL XR) 10 MG 24 hr capsule Take 1 capsule (10 mg total) by mouth daily. 30 capsule 0     No current facility-administered medications for this visit.      Carmina Bennett, Adult-Geriatric Nurse Practitioner  [facility] - Internal Medicine Team     1/13/2021

## 2021-06-14 NOTE — PATIENT INSTRUCTIONS - HE
Try the Adderall 10mg daily, if you feel like this is not strong enough after one week we can increase your dose if needed. Just send me a my chart message.     Good luck on selling the house!    I will plan on seeing you back if anything comes up.

## 2021-06-15 NOTE — PROGRESS NOTES
"Assessment/Plan:     1. Concussion without loss of consciousness, initial encounter  Symptoms consistent with concussion.  No neurological deficit on exam.  Head imaging not indicated at this time.  Discussed management of concussions; mainly mental rest.  Recommend no work until not having headaches at rest; note written for work.  Limit screen time including cell phone, television, reading, and tablets.  He may continue Tylenol and/or Ibuprofen as needed for pain.  Continue to rest.  Discussed symptoms that would warrant emergent care including loss of consciousness, severe headache, uncontrolled vomiting, weakness, or new confusion.  Recommend follow up in 1 week.  Patient and wife verbalize understanding.         Subjective:     Herminio Gloria is a 37 y.o. male accompanied by his wife who presents with concerns about a concussion.  Patient fell out of bed early Sunday morning.  He believes he hit the left side of his head on his nightstand.  No loss of consciousness that he is aware of.  He does not remember the incident, but his wife woke up to him getting off the floor and going into the bathroom.  Initially, he was not speaking to her and had a \"glazed\" appearance on his face.  He later started speaking, but was a little disoriented.  Patient reports continued mild headache, nausea, sensitivity to light.sound, anxiety, impatience, and irritability.  Wife agrees that patient has not been acting like himself.  He has been more fatigued and short tempered.  Denies any change in vision, dizziness, vertigo, or vomiting.  He seems to be sleeping well at night.  History of sleep walking since college.  He is not sure if he was trying to get up to sleep walk.  No history of head injury.  Possible undiagnosed concussion as a child.  Taking Tylenol for headache.  Works as a ; has not been working this week.       The following portions of the patient's history were reviewed and updated as appropriate: " "allergies, current medications, past family history, past medical history, past social history, past surgical history and problem list.    Review of Systems  Pertinent items are noted in HPI.     Objective:     /80 (Patient Site: Right Arm, Patient Position: Sitting, Cuff Size: Adult Large)  Pulse 77  Temp 98.5  F (36.9  C) (Oral)   Resp 16  Ht 5' 11\" (1.803 m)  Wt 190 lb 4.8 oz (86.3 kg)  SpO2 99%  BMI 26.54 kg/m2    General Appearance: Alert, cooperative, no distress, appears stated age  Head: Normocephalic, without obvious abnormality, atraumatic  Eyes: PERRL, conjunctiva/corneas clear, EOM's intact. No nystagmus.  Ears: Normal TM's and external ear canals, both ears  Throat: Lips, mucosa, and tongue normal; teeth and gums normal  Neck: Supple, symmetrical, trachea midline, no adenopathy  Lungs: Clear to auscultation bilaterally, respirations unlabored  Heart: Regular rate and rhythm, S1 and S2 normal, no murmur, rub, or gallop   Abdomen: Soft, non-tender, bowel sounds active all four quadrants,  no masses, no organomegaly  Extremities: Extremities normal, atraumatic, no cyanosis or edema  Neurologic: A&Ox4. CN II-XII intact. Patellar and biceps reflexes 2/4 bilaterally. Upper and lower extremity strength 5/5 bilaterally. Heel to toe test intact. Finger to nose intact. Romberg negative.   Psychologic: appropriate affective, answers all of my questions appropriately. No hallucinations, delusion, or suicidal ideations.    Jyothi Martinez, NP-C    "

## 2021-06-15 NOTE — PROGRESS NOTES
Herminio Gloria is a 40 y.o. male who is being evaluated via a billable video visit.      How would you like to obtain your AVS? MyChart.  If dropped from the video visit, the video invitation should be resent by: Text to cell phone: cell  Will anyone else be joining your video visit? No      Video Start Time: 0950    Assessment & Plan     Attention deficit hyperactivity disorder (ADHD), combined type: He continues to tolerate the Adderall 10mg daily. He has been tolerating the medication well. He reports a significant decrease in his caffeine intake, and has been able to concentrate better at work. We will continue his current dose of Adderall. Controlled substance agreement sent to the patient via my chart to sign and return to provider.       Return if symptoms worsen or fail to improve.    Carmina Bennett, URSZULA  [facility]    Subjective   Herminio Gloria is 40 y.o. and presents today for the following health issues   HPI The patient reports that he is doing well on his Adderall dose. He has a history of substance abuse, and was talking with his sponsor, and would like to sign a controlled substance agreement. [place] prescription monitoring checked, he has only received this medication from me.     Video-Visit Details    Type of service:  Video Visit    Video End Time (time video stopped): 9:56  Originating Location (pt. Location): Home    Distant Location (provider location):  [facility]     Platform used for Video Visit: [employer]

## 2021-06-17 NOTE — PROGRESS NOTES
ASSESSMENT:  1. Anxiety disorder, unspecified type  - citalopram (CELEXA) 10 MG tablet; Take 1 tab (10mg ) daily for 1 week then 2 tabs (20 mg) daily thereafter.  Dispense: 60 tablet; Refill: 0  - Hepatic Profile  - JIC LAV    2. Alcohol dependence with unspecified alcohol-induced disorder  - LORazepam (ATIVAN) 0.5 MG tablet; Take 1 tablet (0.5 mg total) by mouth 2 (two) times a day as needed for anxiety.  Dispense: 30 tablet; Refill: 0      PLAN:  He already has an appointment to start outpatient chemical dependency treatment.  He is a major concern today and need for coming today is to talk about anxiety and depression and actually consider starting medication.  He also wants to see if we can get some prescription that will also help to calm him down especially for planned travels that they will emback on.  He noted that he is doing well, will continue to monitor if he is having any abnormal jitteriness.  He is given a prescription for lorazepam that will also help him with that.  His wife noted that she will be the one that will be controlling the use of that medication.  We also discussed anxiety and depression.  It does appear that he is using his alcohol to hide that.  We discussed use of medications for the anxiety while he continues or starts to do his substance abuse treatment.  He will get labs especially for hepatic profile since he did not get that while he was in the hospital.  I will call with the results.  He will follow-up as needed.    Orders Placed This Encounter   Procedures     Hepatic Profile     JIC LAV     There are no discontinued medications.    No Follow-up on file.      CHIEF COMPLAINT:  Chief Complaint   Patient presents with     Follow-up     ER follow up- feeling better.        HISTORY OF PRESENT ILLNESS:  Herminio is a 37 y.o. male presenting to the clinic today for follow-up of emergency room visit for alcohol use.  Was brought in by his wife.  He had been to the emergency room  following an episode of having altered mental status following exercise and use of alcohol.  He noted being on the stress and has been drinking a lot of alcohol unknown to his wife.  He noted a lot of use during the day and sometimes at night.  These he notes was to come him down from all the stress that he thinks that he is having.  He noted that he is the one causing that and he is blaming himself for putting his wife in a difficult position.  There is stressors especially around his work and job.  Also has some family stressors that is a revolving around his children and family.  He did note that he tries all he can to hide the drinking problem.  He thinks this point he is ready to quit using and be able to treat himself and become accountable.  He noted that he is being slightly anxious most of his life though has not been treated in the past.  He did note that he is feeling much better currently with regards to what made him go to the emergency room.  He is able to talk well, he is no longer slowed, denies any nausea or vomiting.  Noted a little bit of jitteriness and feeling of being slightly warmed up intermittently.  Denies having any suicidal or homicidal ideation.    REVIEW OF SYSTEMS:   He still feels slightly higher jittery, is able to sleep to an extent.  Denies any abdominal pain, no nausea no vomiting.  Appetite is returning.  He does not have any skin rash.  There is no chest pain no shortness of breath.  There is no lower extremity swellings and no joint aches and pain.  Does not have any muscle discomfort.  No dizziness, no headaches, he denies having any numbness.  He does have some anxiety that feels bad about it.  Also does have some blame for himself.  All other systems are negative.    PFSH:  Reviewed, as below.    History   Smoking Status     Never Smoker   Smokeless Tobacco     Never Used       Family History   Problem Relation Age of Onset     Hypertension Mother      Hypertension Brother         Social History     Social History     Marital status: Single     Spouse name: N/A     Number of children: N/A     Years of education: N/A     Occupational History     Not on file.     Social History Main Topics     Smoking status: Never Smoker     Smokeless tobacco: Never Used     Alcohol use 2.4 oz/week     4 Cans of beer per week     Drug use: Not on file     Sexual activity: Not on file     Other Topics Concern     Not on file     Social History Narrative       No past surgical history on file.    No Known Allergies    Active Ambulatory Problems     Diagnosis Date Noted     No Active Ambulatory Problems     Resolved Ambulatory Problems     Diagnosis Date Noted     No Resolved Ambulatory Problems     Past Medical History:   Diagnosis Date     Concussion 01/2018       Current Outpatient Prescriptions   Medication Sig Dispense Refill     acetaminophen (TYLENOL) 500 MG tablet Take 500 mg by mouth every 6 (six) hours as needed for pain.       MULTIVITAMIN (MULTIPLE VITAMINS ORAL) Take by mouth.       citalopram (CELEXA) 10 MG tablet Take 1 tab (10mg ) daily for 1 week then 2 tabs (20 mg) daily thereafter. 60 tablet 0     LORazepam (ATIVAN) 0.5 MG tablet Take 1 tablet (0.5 mg total) by mouth 2 (two) times a day as needed for anxiety. 30 tablet 0     No current facility-administered medications for this visit.        VITALS:  Vitals:    03/30/18 1521 03/30/18 1613   BP: 130/90 122/80   Patient Site: Left Arm Left Arm   Patient Position: Sitting Sitting   Cuff Size: Adult Large Adult Large   Pulse: 76    SpO2: 97%    Weight: 185 lb 14.4 oz (84.3 kg)      Wt Readings from Last 3 Encounters:   03/30/18 185 lb 14.4 oz (84.3 kg)   03/28/18 190 lb (86.2 kg)   01/15/18 190 lb 4.8 oz (86.3 kg)     Body mass index is 25.93 kg/(m^2).    PHYSICAL EXAM:  General Appearance: Alert, cooperative, no distress, appears stated age  HEENT: Pupils are equal and reactive, extraocular motions is normal.  Oropharynx is moist.  Neck is  supple no notable thyromegaly.  External ears are normal.  Lungs: Clear to auscultation bilaterally, respirations unlabored  Heart: Regular rate and rhythm, S1 and S2 normal, no murmur, rub, or gallop  Abdomen: Soft, he does not have any tenderness and there is no masses felt.  Musculoskeletal: Normal range of motion. No joint swelling or deformity.   Neurologic:  Alert and oriented times 3. Cranial nerves II-XII intact.   Psychiatric: Normal mood and affect.  He actually does not have any anxiety notable.  Does not look depressed.  He does have insight to his medical issue and is willing to commit to treatment.    MEDICATIONS:  Current Outpatient Prescriptions   Medication Sig Dispense Refill     acetaminophen (TYLENOL) 500 MG tablet Take 500 mg by mouth every 6 (six) hours as needed for pain.       MULTIVITAMIN (MULTIPLE VITAMINS ORAL) Take by mouth.       citalopram (CELEXA) 10 MG tablet Take 1 tab (10mg ) daily for 1 week then 2 tabs (20 mg) daily thereafter. 60 tablet 0     LORazepam (ATIVAN) 0.5 MG tablet Take 1 tablet (0.5 mg total) by mouth 2 (two) times a day as needed for anxiety. 30 tablet 0     No current facility-administered medications for this visit.

## 2021-06-17 NOTE — PROGRESS NOTES
ASSESSMENT:  1. Anxiety disorder, unspecified type  - citalopram (CELEXA) 20 MG tablet; Take 1 tablet (20 mg total) by mouth daily.  Dispense: 90 tablet; Refill: 2    2. Alcohol dependence with unspecified alcohol-induced disorder  - Ambulatory referral to Chemical Dependency      PLAN:  I did discuss with him the necessity of being off of alcohol and been able to note that alcohol will not add any specific things that is very beneficial to him.  He is counseled to not use alcohol to identify himself but to be able to stand and resist the urge.  He will continue with his therapy at this time and continue also with his medication.  He does seem to want to see a addiction therapist as an added management for him to be able to continue to do well.  This was done and he will call to follow-up with that.    Orders Placed This Encounter   Procedures     Ambulatory referral to Chemical Dependency     Referral Priority:   Routine     Referral Type:   Substance Abuse     Referral Reason:   Evaluation and Treatment     Requested Specialty:   Addiction Medicine     Number of Visits Requested:   1     Medications Discontinued During This Encounter   Medication Reason     citalopram (CELEXA) 10 MG tablet Reorder       No Follow-up on file.      CHIEF COMPLAINT:  Chief Complaint   Patient presents with     Follow-up     med check-        HISTORY OF PRESENT ILLNESS:  Herminio is a 37 y.o. male presenting to the clinic today for follow-up of anxiety.  He was seen in the months for follow-up of emergency room for alcohol use.  He was started on medication for anxiety which appear to be part of what is triggering him to drink.  He was also given some lorazepam to help with any symptoms that is associated with that.  He did go for a vacation with his family and noted that he did well about vacation.  He noted that he has not had any alcohol since then and is able to deal with cravings especially when his friends or his family members  around him.  He has had no drinks even when he is alone at home.  He noted that he is also doing well with regards to anxiety.  He is taking Celexa and noted no side effects of the medication.  He comes in today to follow-up as agreed on previously and does not have any new concerns.  REVIEW OF SYSTEMS:   He feels well and does not have any known side effects at this time.  His appetite is good, denies any dizziness and no lightheadedness.  He feels a strong and does have good sleep pattern and no insomnia.  Denies any abdominal pain.  He does not have any known major depression or anxiety at this point appears to be able to deal with any psychological issues at this time.  All other systems are negative.    PFSH:  Reviewed, as below.    History   Smoking Status     Never Smoker   Smokeless Tobacco     Never Used       Family History   Problem Relation Age of Onset     Hypertension Mother      Hypertension Brother        Social History     Social History     Marital status: Single     Spouse name: N/A     Number of children: N/A     Years of education: N/A     Occupational History     Not on file.     Social History Main Topics     Smoking status: Never Smoker     Smokeless tobacco: Never Used     Alcohol use 2.4 oz/week     4 Cans of beer per week     Drug use: Not on file     Sexual activity: Not on file     Other Topics Concern     Not on file     Social History Narrative       No past surgical history on file.    No Known Allergies    Active Ambulatory Problems     Diagnosis Date Noted     Mixed hyperlipidemia 04/12/2007     Resolved Ambulatory Problems     Diagnosis Date Noted     No Resolved Ambulatory Problems     Past Medical History:   Diagnosis Date     Concussion 01/2018       Current Outpatient Prescriptions   Medication Sig Dispense Refill     acetaminophen (TYLENOL) 500 MG tablet Take 500 mg by mouth every 6 (six) hours as needed for pain.       citalopram (CELEXA) 20 MG tablet Take 1 tablet (20 mg  total) by mouth daily. 90 tablet 2     LORazepam (ATIVAN) 0.5 MG tablet Take 1 tablet (0.5 mg total) by mouth 2 (two) times a day as needed for anxiety. 30 tablet 0     MULTIVITAMIN (MULTIPLE VITAMINS ORAL) Take by mouth.       No current facility-administered medications for this visit.        VITALS:  Vitals:    05/04/18 1447   BP: 122/80   Patient Site: Left Arm   Patient Position: Sitting   Cuff Size: Adult Regular   Pulse: 60   Weight: 180 lb 11.2 oz (82 kg)     Wt Readings from Last 3 Encounters:   05/04/18 180 lb 11.2 oz (82 kg)   03/30/18 185 lb 14.4 oz (84.3 kg)   03/28/18 190 lb (86.2 kg)     Body mass index is 25.2 kg/(m^2).    PHYSICAL EXAM:  General Appearance: Alert, cooperative, no distress, appears stated age,with good eye contact.  HEENT: Pupils are equal and reactive, extraocular motions is normal.Neck is supple no notable thyromegaly.  External ears are normal.  Lungs: Respiration is unlabored  Heart: Normal peripheral pulsation.  Abdomen: Soft  Musculoskeletal: Normal range of motion.   Neurologic:  Alert and oriented times 3.   Psychiatric: Normal mood and affect.  He does look to have improved quite a bit compared to the first time that he was seen.  He looks very upbeat and positive.    MEDICATIONS:  Current Outpatient Prescriptions   Medication Sig Dispense Refill     acetaminophen (TYLENOL) 500 MG tablet Take 500 mg by mouth every 6 (six) hours as needed for pain.       citalopram (CELEXA) 20 MG tablet Take 1 tablet (20 mg total) by mouth daily. 90 tablet 2     LORazepam (ATIVAN) 0.5 MG tablet Take 1 tablet (0.5 mg total) by mouth 2 (two) times a day as needed for anxiety. 30 tablet 0     MULTIVITAMIN (MULTIPLE VITAMINS ORAL) Take by mouth.       No current facility-administered medications for this visit.

## 2021-06-18 NOTE — PATIENT INSTRUCTIONS - HE
Patient Instructions by Carmina Bennett CNP at 10/26/2020  3:20 PM     Author: Carmina Bennett CNP Service: -- Author Type: Nurse Practitioner    Filed: 10/26/2020  3:35 PM Encounter Date: 10/26/2020 Status: Addendum    : Carmina Bennett CNP (Nurse Practitioner)    Related Notes: Original Note by Carmina Bennett CNP (Nurse Practitioner) filed at 10/26/2020  3:35 PM       I have referred you for psychiatry eval for your ADHD.  I have placed as an urgent fashion, scheduling should call you within 24 to 48 hours to help set this appointment up.    I should see you back in about 3 months for a routine physical with fasting labs. Please let me know if anything comes up before then.    Patient Education     Attention-Deficit/Hyperactivity Disorder (ADHD) in Adults  Youve always had trouble concentrating. Your mind wanders, and its hard to finish tasks. As a result, you didnt do well in school. And now, you often struggle with your job. Sometimes this makes you simons or depressed. These may be symptoms of attention-deficit/hyperactivity disorder (ADHD). To find out more, talk to your healthcare provider. He or she can offer guidance and support.  Symptoms  of ADHD in adults  For an adult to be diagnosed with ADHD, the symptoms must have been present since childhood. The symptoms may include:    Trouble thinking things through    Low self-esteem    Depression    Trouble holding a job    Memory problems    Problems with a marriage or relationship    Lack of discipline   What is ADHD?  Attention-deficit/hyperactivity disorder makes it hard to focus your mind. You may daydream a lot. And you may be restless much of the time. As a result, you may have trouble with detailed or boring work. And it may be hard to stick with one project for very long. You also may forget things. Or, you may miss key points during a lecture or meeting. You may even have trouble sitting through a movie or  concert. At times, you may feel frustrated or angry. This can affect your relationships with others.  Who does it affect?  ADHD starts in childhood. Sometimes, your symptoms may improve as you get older. But they also may persist into your adult years. ADHD is often thought of as a kids problem. Thats why its often missed in adults. In fact, many parents learn they have ADHD when their children are diagnosed.  What causes it?  The exact cause of ADHD isnt known. The disorder does run in families. Having one parent with ADHD makes it more likely youll have it too. And the part of your brain that controls attention may be involved. Certain brain chemicals that are out of balance may also play a role.  What can be done?  The first step is finding out if you really have ADHD. Your doctor will use special guidelines to diagnose the disorder. Most adults with ADHD are greatly helped by therapy and coaching. In some cases, your doctor may also prescribe medicine to ease your symptoms.  Resources    National Resource Center on AD/HDwww.rygk9ffig.org    Attention Deficit Disorder Associationwww.add.org   Date Last Reviewed: 1/1/2017 2000-2019 The "[employer]. [address] [place] [address], [place] [address]. All rights reserved. This information is not intended as a substitute for professional medical care. Always follow your healthcare professional's instructions.

## 2021-06-21 NOTE — PROGRESS NOTES
"Intake Note:     D) Herminio Gloria is a 38 y.o.   White or  male who is referred to [facility] via [facility]25 Assessment with funding from [facility]. Patient orientated x 3. Patient meets criteria for Alcohol Use Disorder - Moderate f10.20.  Patient appears appropriate for EOP.   A) Met with patient for 50 minutes.  Completed intake assessment and preliminary paperwork. Patient was given and explained counselor & supervisor license number and contact info, Patient Bill of Rights, program rules/regulations, Program Abuse Prevention Plan, confidentiality & HIPPA policies, grievance procedure, presented ROIs, TB & HIV/AIDS policies & resources, and Vulnerable Adult policy.   Conducted Vulnerable Adult Assessment.   R)No special Vulnerable Adult needed at this time.  Patient signed and agreed to counselor & supervisor license number and contact info., Patient Bill of Rights, group rules/regulations, Program Abuse Prevention Plan, confidentiality & HIPPA policies, grievance procedure,  ROIs, TB & HIV/AIDS policies & resources, and Vulnerable Adult policy. Patient scored neg   on C-SSRS screen. Patient denied suicidal ideation/intent/plan/means at this time.     Opioid Use Disorder: No   Provided \"Options for Opioid Treatment in [place] and Overdose Prevention\" No     Dimension #1 - Withdrawal Potential - Risk 0. Ct cites no signs or symptoms of withdrawal and is able to manage any discomfort.   SYLVIA reported as 11/11/18    Dimension #2 - Biomedical - Risk 0. Ct reports being in good health and is able to access all medical services as needed.  No barriers to treatment participation noted at this time.      Dimension #3 - Emotional , Behavioral and Cognitive - Risk 1.  Ct reports feeling anxious and stress about living up to others expectations.  He admits to drinking to cope with negative emotions.  Ct is taking Celexa as prescribed by his PCP and finds it helpful.  Ct acknowledges that psychotherapy may be " helpful.      Dimension #4 - Readiness for Change - Risk 1. Ct acknowledges that his alcohol use is problematic, but states that he would like to be able to drink socially one day.  Ct acknowledged abstinence as an expectation of treatment.    Dimension #5 - Relapse Potential - Risk 3. Patient lacks insight to disease of addiction and personal relapse process. Patient lacks positive coping skills.  Ct has never engaged in treatment before.    Dimension #6 - Recovery Environment - Risk 1. Ct is employed [employer], owns his home and lives with his wife and young son.  Ct cites no prior or current legal issues.  Ct acknowledges that he lacks recovery supports.    T) Explained counselor & supervisor license number and contact info, Patient Bill of Rights, program rules/regulations, Program Abuse Prevention Plan, confidentiality & HIPPA policies, grievance procedure, presented ROIs, TB & HIV/AIDS policies & resources, and Vulnerable Adult policy. Patient expected to start group on 11/14/18.      ALEXANDRA Simons  11/15/2018, 3:17 PM

## 2021-06-21 NOTE — PROGRESS NOTES
Addiction Services - Initial Services Plan     Patient  Name: Herminio Gloria  MRN: [id number]   : 1980  Admit Date: 18       Patient describes their immediate need: To learn recovery skills to prevent relapse.     Are there any immediate Safety Needs such as (physical, stability, mobility):  Pt is able to get medical care as needed. Pt denies immediate concerns.     Immediate Health Needs and Plan:   None noted at the time of Intake    Vulnerable Adult: No     Issues to be addressed in the first sessions:   Introduction to group    Patient strengths and needs:   Strengths identified as My potential to be very caring  Needs identified as support    Plan for patient for time between intake and completion of the treatment plan:   Attend all group therapy sessions as directed, complete all written and oral assignments as directed, and remain clean and sober. A relapse, if any, must be reported to staff immediately in order to ensure you are receiving the proper level of care. If you cannot attend a group therapy session you must call contact information provided in intake folder and leave a message before or during group hours.           Vulnerable Adult Review   [X] Review of the Facility Abuse Prevention plan was reviewed with the patient   [X] No Individual Abuse Plan is necessary   [ ] In addition to the Facility Abuse Prevention plan, an Individual Abuse Plan will be put in place       Staff Name/Title: ALEXANDRA Simons   Date: 2018  Time: 2:44 PM

## 2021-06-21 NOTE — PROGRESS NOTES
Rule 25 Assessment  Background Information   1. Date of Assessment Request  2. Date of Assessment  10/31/2018  3. Date Service Authorized     4.   ALEXANDRA Alberto 5.  Phone Number [phone] 6. Referent  Self 7. Assessment Site  [facility]     8. Client Name Herminio Gloria 9. Date of Birth  1980 Age  38 y.o. 10. Gender  male  11. PMI/ Insurance No.   12. Client's Primary Language:  English 13. Do you require special accommodations, such as an  or assistance with written material? No   14. Current Address: [address] [place] [address]   15. Client Phone Numbers: [phone] (home)    16.  Alternate (cell) Phone Number:       17. Tell me what has happened to bring you here today.     Assessment completed in an outpatient setting.  Started drinking again and has decided he may need treatment. Was recommended to do outpatient back in April but chose not to. Started social drinking in June. Became stressed and started drinking to cope. Began hiding it from his wife. Bought a bottle of vodka and drank the whole thing a few weeks ago.    18. Have you had other rule 25 assessments? yes, when, where, and what circumstances:  April 2018 at [facility].    DIMENSION I - Acute Intoxication /Withdrawal Potential   1. Chemical use most recent 12 months outside a facility and other significant use history (client self-report)             X = Primary Drug Used   Age of First Use Most Recent Pattern of Use and Duration   Need enough information to show pattern (both frequency and amounts) and to show tolerance for each chemical that has a diagnosis   Date of last use and time, if needed   Withdrawal Potential? Requiring special care Method of use  (oral, smoked, snort, IV, etc)   [x] Alcohol 21 Prior to April daily drinking. Mostly social drinking currently.  10/10/18  Oral   [] Marijuana/Hashish  Denies.      [] Cocaine/Crack  Denies.      [] Meth/Amphetamines  Denies.       [] Heroin  Denies.      [] Other Opiates/Synthetics  Denies.      [] Inhalants  Denies.      [] Benzodiazepines  Denies.      [] Hallucinogens  Denies.      [] Barbiturates/Sedatives/Hypnotics  Denies.      [] Over-the-Counter Drugs  Denies.      [] Other  Denies.      [] Nicotine  Denies.        2. Do you use greater amounts of alcohol/other drugs to feel intoxicated or achieve the desired effect? no.  Or use the same amount and get less of an effect? No (DSM)  Example: NA    3A. Have you ever been to detox? no    3B. When was the first time? NA    3C. How many times since then? NA    3D. Date of most recent detox: NA      4.  Withdrawal symptoms: Have you had any of the following withdrawal symptoms?  no  Past 12 months Recent (past 30 days)   None None     Notes:  None    's Visual Observations and Symptoms: No physical signs and/or symptoms of intoxication or withdrawal observed.  Based on the above information, is withdrawal likely to require attention as part of treatment participation?  no    Dimension I Ratings   Acute intoxication/Withdrawal potential - The placing authority must use the criteria in Dimension I to determine a client s acute intoxication and withdrawal potential.    RISK DESCRIPTIONS - Severity ratin  Client displays full functioning with good ability to tolerate and cope with withdrawal discomfort.  No signs or symptoms of intoxication or withdrawal or resolving signs or symptoms    REASONS SEVERITY WAS ASSIGNED (What about the amount of the person s use and date of most recent use and history of withdrawal problems suggests the potential of withdrawal symptoms requiring professional assistance? )    Patient reports last use of alcohol on 10/10/18. Patient denies experiecning withdrawal symptoms.     DIMENSION II - Biomedical Complications and Conditions   1. Do you have any current health/medical conditions?(Include any infectious diseases, allergies, or chronic or acute  pain, history of chronic conditions)    None    2. Do you have a health care provider? When was your most recent appointment? What concerns were identified?    Dr. Herrera. Last appointment in .    3. If indicated by answers to items 1 or 2: How do you deal with these concerns? Is that working for you? If you are not receiving care for this problem, why not?    No concerns.      4A. List current medication(s) including over-the-counter or herbal supplements--including pain management:    Celexa  Multi Vitamin  Allegra    4B. Do you follow current medical recommendations/take medications as prescribed?   yes    4C. When did you last take your medication?  10/31/18    5. Has a health care provider/healer ever recommended that you reduce or quit alcohol/drug use?  No (DSM)    6. Are you pregnant?  NA      6B.  Receiving prenatal care?  NA      6C.  When is your baby due?      7. Have you had any injuries, assaults/violence towards you, accidents, health related issues, overdose(s) or hospitalizations related to your use of alcohol or other drugs:  no    8. Do you have any specific physical needs/accommodations? no    Dimension II Ratings   Biomedical Conditions and Complications - The placing authority must use the criteria in Dimension II to determine a client s biomedical conditions and complications.   RISK DESCRIPTIONS - Severity ratin  Client displays full functioning with good ability to cope with physical discomfort.    REASONS SEVERITY WAS ASSIGNED (What physical/medical problems does this person have that would inhibit his or her ability to participate in treatment? What issues does he or she have that require assistance to address?)    Patient reports no health concerns. Patient has primary care provider. Patient is able to seek cares as needed.             DIMENSION III - Emotional, Behavioral, Cognitive Conditions and Complications   1. (Optional) Tell me what it was like growing up in your family.  (substance use, mental health, discipline, abuse, support)     Parents are together. Very supportive. Grew up well. Expectations were to do well. 1 older sister,1 younger brother. Some distant relatives with alcohol issues. Brother and father are regular casual drinkers. Sister might have OCD and anxiety. No abuse.    2.  When was the last time that you had significant problems  Past month 2-12 months ago 1+ years ago Never   A. With feeling very trapped, lonely, sad, blue, depressed or hopeless about the future? [x]  []  [] []     B. With sleep trouble, such as bad dreams, sleeping restlessly, or falling asleep during the day? [x] [] [] []   C. With feeling very anxious, nervous, tense, scared, panicked, or like something bad was going to happen? [x] [] [] []   D. With becoming very distressed and upset when something reminded you of the past? [] [] [] [x]   E. With thinking about ending your life or committing suicide?  [] [] [] [x]     3.  When was the last time that you did the following things two or more times? Past month 2-12 months ago 1+ years ago Never   A. Lied or conned to get things you wanted or to avoid having to do something? [] [x] [] []   B. Had a hard time paying attention at school, work, or home? [] [x] [] []   C. Had a hard time listening to instructions at school, work, or home?  [] [x] [] []   D. Were a bully or threatened other people? [] [] [] [x]   E. Started physical fights with other people? [] [] [] [x]     Note: These questions are from the Global Appraisal of Individual Needs--Short Screener. Any item marked  past month  or  2 to 12 months ago  will be scored with a severity rating of at least 2.  For each item that has occurred in the past month or past year ask follow up questions to determine how often the person has felt this way or has the behavior occurred? How recently? How has it affected their daily living? And, whether they were using or in withdrawal at the time?    2A. Due  to drinking again, was scared he might have skin cancer again, and due to stressors.  2B. Has reoccurring dreams where he is delayed getting somewhere. Such as people are standing in his way.  2C. Has a flight or fight response when anxiety is triggered. When things are not in his control.  2D. NA  2E. NA    3A. Related to hiding alcohol use.   3B. Had some trouble after concussion. Nothing presently.  3C. After concussion. Nothing presently.  3D. NA  3E. NA      4A. If the person has answered item 2E with  in the past year  or  the past month , ask about frequency and history of suicide in the family or someone close and whether they were under the influence.    Any history of suicide in your family? Or someone close to you?  yes, Explain:  Cousins, not close. Last time was cousin was 2 years ago.      4B. If the person answered item 2E  in the past month  ask about  intent, plan, means and access and any other follow-up information  to determine imminent risk. Document any actions taken to intervene  on any identified imminent risk.     NA    5A. Have you ever been diagnosed with a mental health problem?  Yes, anxiety. ADHD diagnosis when he was in college.  5B. Are you receiving care for any mental health issues? no  If yes, what is the focus of that care or treatment?  Are you satisfied with the service?  Most recent appointment?  How has it been helpful?    NA    6A.  Have you been prescribed medications for emotional/psychological problems?  yes  6B.  Current mental health medication(s) If these medications are listed for Dimension II, reference item II-5.  6C.  Are you taking your medications as instructed?  yes    7A. Does your MH provider know about your use?  NA  7B.  What does he or she have to say about it? (DSM)  NA    8A. Have you ever been verbally, emotionally, physically or sexually abused? no   Follow up questions to learn current risk, continuing emotional impact.  NA  8B. Have you received  counseling for abuse?  NA    9A. Have you ever experienced or been part of a group that experienced community violence, historical trauma, rape or assault? no  9B.  How has that affected you?    9C.  Have you received counseling for that?  NA    10A. : no  10B.  Exposure to Combat?  no    11. Do you have problems with any of the following things in your daily life?  None      Note: If the person has any of the above problems, how do they deal with them, have they developed coping mechanisms?  Have they received treatment?  Follow up with items 12, 13, and 14. If none of the issues in item 11 are a problem for the person, skip to item 15.    NA    12. Have you been diagnosed with traumatic brain injury or Alzheimer s?  no    13.  If the answer to #12 is no, ask the following questions:    Have you ever hit your head or been hit on the head? yes    Were you ever seen in the Emergency Room, hospital, or by a doctor because of an injury to your head? Yes, ER visit for concussion earlier this year.    Have you had any significant illness that affected your brain (brain tumor, meningitis, West Nile Virus, stroke or seizure, heart attack, near drowning or near suffocation)?  no    14.  If the answer to # 12 is yes, ask if any of the problems identified in #11 occurred since the head injury or loss of oxygen NA    15A. Highest grade of school completed:  Bachelor's Degree  15B. Do you have a learning disability? no  15C. Did you ever have tutoring in Math or English? no.  15D. Have you ever been diagnosed with Fetal Alcohol Effects or Fetal Alcohol Syndrome? no    Explain:      16. If yes to item 15 B, C, or D: How has this affected your use or been affected by your use?     NA    Dimension III Ratings   Emotional/Behavioral/Cognitive - The placing authority must use the criteria in Dimension III to determine a client s emotional, behavioral, and cognitive conditions and complications.   RISK DESCRIPTIONS - Severity  "ratin  Client has difficulty with impulse control and lacks coping skills.  Client has thoughts of suicide or harm to others without means; however, the thoughts may interfere with participation in some treatment activities.  Client has difficulty functioning in significant life areas.  Client has moderate symptoms of emotional, behavioral, or cognitive problems.  Client is able to participate in most treatment activities.    REASONS SEVERITY WAS ASSIGNED - What current issues might with thinking, feelings or behavior pose barriers to participation in a treatment program? What coping skills or other assets does the person have to offset those issues? Are these problems that can be initially accommodated by a treatment provider? If not, what specialized skills or attributes must a provider have?    Patient reports anxiety. Patient reports past diagnosis of ADHD. Patient reports difficulty coping with anxiety. Patient does not have mental health provider.           DIMENSION IV - Readiness for Change   1. You ve told me what brought you here today. (first section) What do you think the problem really is?     \"I don't know. I think I've used alcohol for a long time to medicate for my anxiety and feeling shitty about things.\" Maybe using alcohol as a coping mechanism. Understands he may not be able to drink for a while, wants to be able to socially drink to be \"normal\"    2. Tell me how things are going. Ask enough questions to determine whether the person has use related problems or assets that can be built upon in the following areas: Family/friends/relationships; Legal; Financial; Emotional; Educational; Recreational/ leisure; Vocational/employment; Living arrangements (DSM)     Overall thinks things are great. Looks at people who have real problems and thinks that he doesn't have any other than himself. Relationships are great. At work gets stressed with incompetent employees.     3. What activities have you " "engaged in when using alcohol/other drugs that could be hazardous to you or others (i.e. driving a car/motorcycle/boat, operating machinery, unsafe sex, sharing needles for drugs or tattoos, etc     Driving.    4. How much time do you spend getting, using or getting over using alcohol or drugs? (DSM)     \"Not a lot\"    5. Reasons for drinking/drug use (Use the space below to record answers. It may not be necessary to ask each item.)  Like the feeling Yes   Trying to forget problems    To cope with stress Yes   To relieve physical pain    To cope with anxiety Yes   To cope with depression    To relax or unwind Yes   Makes it easier to talk with people Sometimes   Partner encourages use    Most friends drink or use Yes   To cope with family problems Yes   Afraid of withdrawal symptoms/to feel better    Other (specify)      A. What concerns other people about your alcohol or drug use/Has anyone told you that you use too much? What did they say? (DSM)  Concerns will be that he has had too much or son will accidentally get into it. Not healthy to drink everyday. Won't be able to drive when there is an emergency.    B. What did you think about that/ do you think you have a problem with alcohol or drug use?     That they are right. Doesn't disagree with them. Feels that the concussion is the turning point for him developing a problem.     6. What changes are you willing to make? What substance are you willing to stop using? How are you going to do that? Have you tried that before? What interfered with your success with that goal?     \"Whatever I have to do. I don't think I need inpatient.\" Doesn't want to do outpatient but is willing to.    7. What would be helpful to you in making this change?     \"i don't know\"    Dimension IV Ratings   Readiness for Change - The placing authority must use the criteria in Dimension IV to determine a client s readiness for change.   RISK DESCRIPTIONS - Severity ratin  Clinet is " "motivated with active reinforcement, to explore treatment and strategies for change, but ambivalent about illness or need for change.    REASONS SEVERITY WAS ASSIGNED - (What information did the person provide that supports your assessment of his or her readiness to change? How aware is the person of problems caused by continued use? How willing is she or he to make changes? What does the person feel would be helpful? What has the person been able to do without help?)    Patient verbalizes a readiness and willingness to change. Patient has positive motivators. Patient is ambivalent about illness.         DIMENSION V - Relapse, Continued Use, and Continued Problem Potential   1. In what ways have you tried to control, cut-down or quit your use? If you have had periods of sobriety, how did you accomplish that? What was helpful? What happened to prevent you from continuing your sobriety? (DSM)     Has tried to be a social drinker. Was sober from April until about June.      2. Have you experienced cravings? If yes, ask follow up questions to determine if the person recognizes triggers and if the person has had any success in dealing with them.     Cravings a couple times per week. Identifies triggers as stress and sometimes happy hour.    3A. Have you been treated for alcohol/other drug abuse/dependence? no  3B.  Number of times (Lifetime) (over what period):    3C.  Number of times completed treatment (Lifetime):    3D.  During the past 3 years have you participated in outpatient and/or residential?  no  3E.  When and where?  3F.  What was helpful?  What was not?    4. Support group participation: Have you/do you attend support group meetings to reduce/stop your alcohol/drug use? How recently? What was your experience? Are you willing to restart? If the person has not participated, is he or she willing?     Has not participated.     5. What would assist you in staying sober/straight?     \"I don't know. I am really " "strong willed.\" Support at home.    Dimension V Ratings   Relapse/Continued Use/Continued problem potential - The placing authority must use the criteria in Dimension V to determine a client s relapse, continued use, and continued problem potential.   RISK DESCRIPTIONS - Severity rating:  3  Bobby has poor recognition and understanding of relapse and recidivism issues and displays moderately high vulnerability for further substance use or mental health problems.  Client has few coping skills and rarely applies coping skills.    REASONS SEVERITY WAS ASSIGNED - (What information did the person provide that indicates his or her understanding of relapse issues? What about the person s experience indicates how prone he or she is to relapse? What coping skills does the person have that decrease relapse potential?)      Patient lacks insight to disease of addiction and personal relapse process. Patient lacks positive coping skills. Patient was recommended to treatment in April but did not follow through.         DIMENSION VI - Recovery Environment   1. Are you employed/attending school? Tell me about that.     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    2A. Describe a typical day; evening for you. Work, school, social, leisure, volunteer, spiritual practices. Include time spent obtaining, using, recovering from drugs or alcohol. (DSM)     Wake up 4:30, go to work, Wednesdays off with son, busy with work daily, with family as mush as possible, biking, outdoor activities, exercise.     2B. How often do you spend more time than you planned using or use more than you planned? (DSM)     Before April it was more of a regular basis.     3. How important is using to your social connections? Do many of your family or friends use?     Not important necessarily, but most friends drink.    4A. Are you currently in a significant relationship?  yes  4B.  If yes, how long?    4C. Sexual Orientation:  " Heterosexual    5A. Who do you live with? Wife and son.  5B. Tell me about their alcohol/drug use and mental health issues. Sometimes wife will drink on weekends. She has postpartum.  5C. Are you concerned for your safety there? no  5D. Are you concerned about the safety of anyone else who lives with you? no    6A. Do you have children who live with you? yes, Explain:  2 year old son.  If the person lives with children, ask follow-up questions to determine the person's relationship and responsibility, both legal and care giving; and what arrangements are made for supervision for the children when the person is not available.      6B. Do you have children who do not live with you?  no  If yes, ask follow up questions to learn where the children are, who has custody and what the person't relaltionship and responsibility is with these children and what hopes the person has for his or her future with these children.    7A. Who supports you in making changes in your alcohol or drug use? What are they willing to do to support you? Who is upset or angry about you making changes in your alcohol or drug use? How big a problem is this for you?      Feels wife is supportive and not supportive.      7B. This table is provided to record information about the person s relationships and available support It is not necessary to ask each item; only to get a comprehensive picture of their support system.  How often can you count on the following people when you need someone?   Partner / Spouse Usually supportive   Parent(s)/Aunt(s)/Uncle(s)/Grandparents    Sibling(s)/Cousin(s)    Child(salma)    Other relative(s)    Friend(s)/neighbor(s)    Child(salma) s father(s)/mother(s)    Support group member(s)    Community of keerthi members    /counselor/therapist/healer    Other (specify)      8A. What is your current living situation?     Independently, in a house with family.    8B. What is your long term plan for where you will be  living?    No change at this time, but maybe down the road move.    8C. Tell me about your living environment/neighborhood? Ask enough follow up questions to determine safety, criminal activity, availability of alcohol and drugs, supportive or antagonistic to the person making changes.      Safe neighborhood. Liquor store is close.    9. Criminal justice history: Gather current/recent history and any significant history related to substance use--Arrests? Convictions? Circumstances? Alcohol or drug involvement? Sentences? Still on probation or parole? Expectations of the court? Current court order? Any sex offenses - lifetime? What level? (DSM)    None.    10. What obstacles exist to participating in treatment? (Time off work, childcare, funding, transportation, pending residential time, living situation)    Work schedule. Missing out on family time.     Dimension VI Ratings   Recovery environment - The placing authority must use the criteria in Dimension VI to determine a client s recovery environment.   RISK DESCRIPTIONS - Severity ratin  Client is engaged in structured, meaningful activity and has supportive significant other, family, and living environment.    REASONS SEVERITY WAS ASSIGNED - (What support does the person have for making changes? What structure/stability does the person have in his or her daily life that willincrease the likelihood that changes can be sustained? What problems exist in the person s environment that will jeopardize getting/staying clean and sober?)     Patient is employed and has stable housing. Patient is engaged in structured daily activities. Patient has no past or current legals.               Client Choice/Exceptions     Would you like services specific to language, age, gender, culture, Shinto preference, race, ethnicity, sexual orientation or disability?  no    If yes, specify:      What particular treatment choices and options would you like to have?  Doesn't know, maybe  thinking outpatient    Do you have a preference for a particular treatment program?  [facility] or [facility]        DSM-V Criteria for Substance Abuse  Instructions  Determine whether the client currently meets the criteria for a Substance Use Disorder using the diagnostic criteria in the DSM-V, pp. 481-589. Current means during the most recent 12 months outside a facility that controls access to substances.    Category of substance Severity ICD-10 Code/DSM V Code   [x]  Alcohol Use Disorder [] Mild  [x] Moderate  [] Severe [] (F10.10) (305.00)  [x] (F10.20) (303.90)  [] (F10.20) (303.90)   []  Cannabis Use Disorder [] Mild  [] Moderate  [] Severe [] (F12.10) (305.20)  [] (F12.20) (304.30)  [] (F12.20) (304.30)   [] Hallucinogen Use Disorder [] Mild  [] Moderate  [] Severe [] (F16.10) (305.30)  [] (F16.20) (304.50)  [] (F16.20) (304.50)   []  Inhalant Use Disorder [] Mild  [] Moderate  [] Severe [] (F18.10) (305.90)  [] (F18.20) (304.60)  [] (F18.20) (304.60)   []  Opioid Use Disorder [] Mild  [] Moderate  [] Severe [] (F11.10) (305.50)  [] (F11.20) (304.00)  [] (F11.20) (304.00)   []  Sedative, Hypnotic, or Anxiolytic Use Disorder [] Mild  [] Moderate  [] Severe [] (F13.10) (305.40)   [] (F13.20) (304.10)  [] (F13.20) (304.10)   []  Stimulant Related Disorders [] Mild  [] Moderate  [] Severe [] (F15.10) (305.70) Amphetamine type substance  [] (F14.10) (305.60) Cocaine  [] (F15.10) (305.70) Other or unspecified stimulant  [] (F15.20) (304.40) Amphetamine type substance  [] (F14.20) (304.20) Cocaine  [] (F15.20) (304.40) Other or unspecified stimulant  [] (F15.20) (304.40) Amphetamine type substance  [] (F14.20) (304.20) Cocaine  [] (F15.20) (304.40) Other or unspecified stimulant   []  Tobacco use Disorder [] Mild  [] Moderate  [] Severe [] (Z72.0) (305.1)  [] (F17.200) (305.1)  [] (F17.200) (305.1)   []  Other (or unknown) Substance Use Disorder [] Mild  [] Moderate  [] Severe [] (F19.10) (305.90)  [] (F19.20)  (304.90)  [] (F19.20) (304.90)       Suggested Level of Care Necessary for Recovery  []  Inpatient  []  Extended Care []  Residential [x]  Outpatient  []  None            Collateral Contact Summary   Number of contacts made:  2  Contact with referring person:  NA     If court related records were reviewed, summarize here:  NA     [x]   Information from collateral contacts supported/largely agreed with information from the client and associated risk ratings.   []   Information from collateral contacts was significantly different from information from the client and lead to different risk ratings.      Summarize new information here:      Rule 25 Assessment Summary and Plan   's Recommendation    Based on the information gathered in this assessment and from collateral information, the client meets DSM-V criteria for meets DSM-V criteria for Alcohol Use Disorder, Moderate, (F10.20) (303.90).      -High intensity outpatient program.  -Follow recommendations of treatment program.  -Abstain from use of alcohol and other mood altering substance not prescribed.    This assessment can be updated with additional information within a 6 month period.      Collateral Contacts     Please duplicate this page for each contact.  If this includes information which is sensitive and not public, separate this page from the rest of the assessment before sharing.  Retain the page in the assessment file.   Name    Ashley Gloria Relationship    Wife Phone Number    [phone] Releases    yes       Information Provided:      In March apparently it was a lot that she was unaware of. Was having at least 3-4 drinks a day. Went to the ER and was sober for about 3 months. Then wanted to try to see if he could drink socially. Would have a glass of wine at family dinner or a birthday party. Sometimes it would be a beer. 3 weeks ago she came home and it was apparent that he was intoxicated. He had about 1/3 of the bottle of vodka. Tried to  lie about it at first but admitted everything the next day. He has been hiding it and that is a huge concern. He drank when he was home alone with their son. It seems to have so much power over him that he has to hide. Doesn't really go out or drink and drive. He just doesn't have control over it. On some level he has acknowledged that he has a problem, hasn't come to terms yet that he is an alcoholic. Still has the mindset that he can control it. Thinks he can do it on his own. The biggest step for him is acknowledging he has a problem and needs help, also that this is going to be lifelong. Thinks anxiety is a big trigger for him. Has been trying to treat the anxiety. Doesn't have a lot of coping techniques. Needs to find ways to cope with stressors in different ways. Needs to do some self-reflection.      Collateral Contacts     Name    Claire Gloria   Relationship    Parents Phone Number    [phone] Releases    yes       Information Provided:      Laquita states he has never been a heavy drinker. Did not drink in high school. Not sure what he did in college. As an adult he has been a social drinker. She was shocked when she found out he was drinking to excess. She has never seen him stumbling drunk. Gets along with people well. Sort of a joker. Highly respected at work. Feels he has a good support system. Wants to make sure he moves forward and takes care of this before it becomes an issues. Sort of perplexed. Did not know it was more than social drinking.           Staff Name and Title:  ALEXANDRA Alberto    Date:  10/31/2018  Time:  9:02 AM

## 2021-06-21 NOTE — PROGRESS NOTES
Herminio Gloria attended 3 hours of group therapy today.    Total group size of 8.    11/19/2018 9:19 PM [facility]

## 2021-06-21 NOTE — PROGRESS NOTES
Weekly Progress Note  Herminio Gloria  BRITTANY; 1980  MRN; [id number]      D) Pt attended 2 groups this week with 0 absences. Patient attended 0 individual sessions this week.   A) Staff facilitated groups and reviewed tx progress. Assessed for [facility].   R) No VAP needed at this time.   Any significant events, defined as events that impact patients relationship with others inside and outside of treatment: No  Indicate any changes or monitoring of physical or mental health problems: No    Indicate involvement by any outside supports: No  IAPP reviewed and modified as needed: NA  Pt working on the following dimensions:    Dimension #1 - Withdrawal Potential - Risk   Specific goals from treatment plan addressed this week:  1. Patient to maintain abstinence throughout outpatient treatment.   2. Patient to report any substance/alcohol use to counselor to determine if any changes need to be made to address withdrawal symptoms.   3. Patient to complete any requested UAs.   Effectiveness of strategies:  The patient reports his DOC as alcohol, and his last use was on 11/10/2018. The patient reports no symptoms of cravings or withdrawal. No signs or symptoms of intoxication or withdrawal observed.      Dimension #2 - Biomedical - Risk   Specific goals from treatment plan addressed this week:  1. Patient to maintain stable health throughout outpatient treatment.   2. Patient to report any changes in physical health to counselor.    3.Patient to take medications as prescribed   Effectiveness of strategies:   The Pt reports having no current health or medical concerns. He reports being in good health and has a PCP.        Dimension #3 - Emotional/Behavioral/Cognitive - Risk   Specific goals from treatment plan addressed this week:  1. Patient will address untreated MH symptoms  2. Patient will manage MH symptoms effectively to avoid future use of substances  3. Patient will develop coping skills to minimize impulsive behaviors                                                                                                                                                   4. Patient will schedule and complete an appointment for a Diagnostic Assessment and follow recommendations for treating MH symptoms  5. Patient to learn and begin using coping skills learned in CD treatment and/or therapy, and share in daily check-ins any benefits or challenges that you experience using these skills.  Effectiveness of strategies:  The patient currently has no psychiatry or MH therapy services. He is being prescribed his psychotropic medication from his PCP.  The patient was educated on Mental Health this week. Topics included: Addressing the Stigma of MH, types of depression and anxiety disorders, Trauma & PTSD, Dual diagnosis, Victim Identity, MH Benefits of Humor also, Exercise, Benefits of MH therapy.      Dimension #4 - Treatment Acceptance/Resistance - Risk   Specific goals from treatment plan addressed this week:  1. Patient to increase motivation towards recovery by participating in outpatient programming.   2. Patient will complete Use History assignment    3. Patient will complete Letter to his Drug of Choice and present in group for processing  Effectiveness of strategies:  The patient attended two days of group sessions this week as he was admitted to hospitals on 11.14.18. He was attentive and participatory during group with his peers.The patient acknowledges that his alcohol use is problematic, but appears to be challenged by the notion that sobriety and recovery is a lifetime event. The patient's external motivation for sobriety is his marriage and children. The patient appears to be at the preparation stage of change.      Dimension #5 - Relapse Potential - Risk   Specific goals from treatment plan addressed this week:  1. Patient will learn to identify triggers and early warning signs of use/relapse to gain awareness of patterns of use.  2. Patient will  complete comprehensive Relapse Prevention Plan prior to program completion.     3. Patient to share in daily check-in any urges and addictive thinking to better understand his pattern of use and to prevent relapse in the future.   4. Patient will learn positive coping skills to deal with stressors as they arise.  Effectiveness of strategies:  Patient appears to lack insight into his disease of addiction and personal relapse process. The patient has never engaged in treatment before, therefore the patient will be introduced to new techniques for recovery.       Dimension #6 - Recovery Environment - Risk   Specific goals from treatment plan addressed this week:  1. Patient will pursue employment.  2. Patient will increase sober support options.  2. Patient will increase participation in sober, enjoyable leisure activities  4. Patient to begin attending AA meetings and establish a home group and sponsor.  Effectiveness of strategies:  The patient resides with his wife and minor son in their own home. He is employed full-time.The patient reports his wife is extremely supportive of his being in treatment. In fact, he reports that she informed him that she and the children will lave him if he did not get help for his drinking. The patient has no legal issues. The patient has minimal, if any sober support in the community. The patient will be encouraged to begin attending at least one sober support meeting weekly and create a social support system.       T) Treatment plan updated: no.  Patient notified and in agreement: NA.  Patient educated on Mental Health. Patient has completed 6 of 84 program hours at this time. Projected discharge date is 5/14/2018. Current discharge/transition plan is Recovery Maintenance programming.     [facility]  11/15/2018        Psycho-Educational Curriculum  Date Attended  Psycho-Educational Curriculum  Date Attended          Acceptance   Shame/Guilt     1st Step   Anger/Rage          "  Affirmations   Mental Health     Automatic Negative Thoughts   Anxiety  11.14, 11.15   Cross Addiction   Depression 11.14, 11.15   Stages of Change   Co-Occurring Disorders 11.15     Alexa/Bipolar    Relapse   Trauma        Victim Identity       Stigma and MH 11.14     MH and Drug Effects 11.14, 11.15,     MH Benefits of Humor 11.14, 11.15,     MH Benefits of Exercise    Addictive Thoughts   CBT-Wise Mind         Catastrophizing 11.15     Mindfulness          Coping Skills   Sober Structure     Relapse Prevention   Continuum of Care           Medical Aspects   Non-12 Step Support     Brain/Neurotransmitters   Priorities       Yoga    Medication Compliance   Spirituality     LV Alcohol/Drug Research   Weekend Planner           Physical Health   Educational Videos     HIV/AIDS education      Post Acute Withdrawal   1st Step     Pregnancy and Drug Use   2nd Step     Sexual Health   Assertive Communication     Short-Term/Long-Term Effects   My name is Naren LEON          Relationships   Cross Addiction     Assertive Communication   God As We Understood Him     Boundaries   HBO Relapse     Codependence    HBO What Is Addiction     Defense Mechanisms    Medical Aspects 1     Family Roles   Medical Aspects 2     Goodbye Letter   [employer]: Stress     Intimacy   PBS Depression Out of the Shadows     Needs/Dealbreakers in Relationships   The Anonymous People    Socialization Skills   [place]       Stop Catastrophic Thinking        11.15,    Feelings   Luis Turner \"Highjacked Brain\"    ABC Model of Emotion   Mauricio Martin Humor in [place]    Grief and Loss   The Mindfulness Movie    Healthy vs. Unhealthy Feelings   Naren LEON documentary     Meditation/Mindfulness   Thursday Motivational Video 11.15   Overconfidence/Complacency       Resentments       Stress           "

## 2021-06-21 NOTE — PROGRESS NOTES
Addiction Services - EOP IndividualTreatment Plan     Patient  Name: Herminio Gloria  MRN: [id number]    : 1980  Admit Date:  18  Date of Treatment Plan: 11/15/18        Dimension 1: Acute Intoxication/Withdrawal Potential, Risk level: 0  Problem Statement from Comprehensive Assessment:  Ct cites no signs or symptoms of withdrawal and is able to manage any discomfort.   SYLVIA reported as 18    Problem: Ct has not been able to sustain his sobriety more than a few months  Goal: Patient to maintain abstinence throughout outpatient treatment.   Must be reached to complete treatment? Yes  Methods/Strategies (must include amount and frequency):   1. Patient to report any substance/alcohol use to counselor to determine if any changes need to be made to address withdrawal symptoms.   2. Patient to complete any requested UAs.   Target Date: 12/15/18  Completion Date:     Dimension 2: Biomedical Conditions/Complications, Risk level: 0  Problem Statement from Comprehensive Assessment:  Ct reports being in good health and is able to access all medical services as needed.  No barriers to treatment participation noted at this time.      Problem: None noted at the time of Intake  Goal: Patient to maintain stable health throughout outpatient treatment.   Must be reached to complete treatment? No  Methods/Strategies (must include amount and frequency):   1. Patient to report any changes in physical health to counselor.   2. Patient to attend all scheduled doctor s appointments.   3. Patient to take medications as prescribed.   Target Date: 12/15/18  Completion Date:         Dimension 3: Emotional/Behavioral/Cognitive, Risk level: 1  Problem Statement from Comprehensive Assessment:  Ct reports feeling anxious and stress about living up to others expectations.  He admits to drinking to cope with negative emotions.  Ct is taking Celexa as prescribed by his PCP and finds it helpful.  Ct acknowledges that psychotherapy  may be helpful.    Problem: He admits to drinking to cope with negative emotions.  Goal:Patient reports a desire to continue to develop more coping skills to address MH symptoms and concerns as well as gain more insight into how to utilize these skills.  Must be reached to complete treatment? Yes  Methods/Strategies (must include amount and frequency):   1. Patient will schedule and complete an appointment for a Diagnostic Assessment and follow recommendations for treating MH symptoms  Target Date: 12/15/18  Completion Date:     Problem: Ct reports feeling anxious and stress about living up to others expectations.  Goal: Patient will manage MH symptoms effectively to avoid future use of substances  Must be reached to complete treatment? Yes  Methods/Strategies (must include amount and frequency):   1. Patient to learn and begin using coping skills learned in CD treatment and/or therapy , and share in daily check-ins any benefits or challenges that you experience using these skills.  2.  Patient to continue taking MH meds as prescribed.  Target Date: 12/15/18  Completion Date:       Dimension 4: Readiness to Change, Risk level 1  Problem Statement from Comprehensive Assessment:  Ct acknowledges that his alcohol use is problematic, but states that he would like to be able to drink socially one day.  Ct acknowledged abstinence as an expectation of treatment.    Problem: Ct acknowledges that his alcohol use is problematic  Goal: Patient to increase motivation towards recovery by participating in outpatient programming.   Must be reached to complete treatment? Yes  Methods/Strategies (must include amount and frequency):   1. Patient to attend all scheduled groups,(M,W,Thurs) 6-9:00 PM, and all scheduled individual counseling sessions.  2. Patient will contact staff beforehand if unable to attend or will be late: Unique([phone])  Target Date: 12/15/18  Completion Date:     Dimension 5: Relapse/Continued  "Use/Continued Problem Potential, Risk level: 3  Problem Statement from Comprehensive Assessment:  Patient lacks insight to disease of addiction and personal relapse process. Patient lacks positive coping skills.  Ct has never engaged in treatment before.    Problem: Patient lacks insight to disease of addiction and personal relapse process  Goal: Patient will learn to identify triggers and early warning signs of use/relapse to gain awareness of patterns of use.  Must be reached to complete treatment? Yes  Methods/Strategies (must include amount and frequency):   Patient to share in daily check-in any urges and addictive thinking to better understand his pattern of use and to prevent relapse in the future.   Target Date: 12/15/18  Completion Date:     Problem:  Patient lacks positive coping skills to deal with daily stressors  Goal: Patient will complete comprehensive Relapse Prevention Plan prior to program completion.  Must be reached to complete treatment? Yes  Methods/Strategies (must include amount and frequency): Patient to complete relapse prevention assignments during treatment, then complete \"Relapse Prevention Planning\" assignment and present to his/her group and/or counselor at time of EOP program completion.  Target Date: 12/15/18  Completion Date:     Dimension 6: Recovery Environment, Risk level: 1  Problem Statement from Comprehensive Assessment:   Ct is employed FT, owns his home and lives with his wife and young son.  Ct cites no prior or current legal issues.  Ct acknowledges that he lacks recovery supports.      Problem: Ct acknowledges that he lacks recovery supports.    Goal: Patient will increase sober support options  Must be reached to complete treatment? Yes  Methods/Strategies (must include amount and frequency): Patient to investigate different forms of sober support, e.g. AA, NA CMA, WFS, HR, online support, participate in some form or support 1-2x weekly, and report reactions to counselor " and/or treatment group.  Target Date: 12/15/18  Completion Date:           Resources  Resources to which the patient is being referred for problems when problems are to be addressed concurrently by another provider: Mental Health Provider    Vulnerable Adult Review   [X] Review of the facility Abuse Prevention plan was reviewed with the patient   [X] No individual abuse plan is necessary   [ ] In addition to the facility Abuse Prevention plan, an Individual Abuse Plan will be put in place       Staff Name/Title: ALEXANDRA Simons Date: 11/15/2018  Time: 3:25 PM    By signing this document, I am acknowledging that I was actively and directly involved in the development of my treatment plan.             Patient  Signature_________________________________________             Date__________________

## 2021-06-21 NOTE — PROGRESS NOTES
Weekly Progress Note  Herminio Gloria  BRITTANY; 1980  MRN; [id number]      D) Pt attended 2 groups this week with 1 absences. Patient attended 0 individual sessions this week. Patient is scheduled to attend EOP 3 times weekly.  A) Staff facilitated groups and reviewed tx progress. Assessed for [facility].   R) No VAP needed at this time.   Any significant events, defined as events that impact patients relationship with others inside and outside of treatment: No  Indicate any changes or monitoring of physical or mental health problems: No    Indicate involvement by any outside supports: No  IAPP reviewed and modified as needed: NA  Pt working on the following dimensions:    Dimension #1 - Withdrawal Potential - Risk   Specific goals from treatment plan addressed this week:  1. Patient to maintain abstinence throughout outpatient treatment.   2. Patient to report any substance/alcohol use to counselor to determine if any changes need to be made to address withdrawal symptoms.   3. Patient to complete any requested UAs.   Effectiveness of strategies:  Effective; The patient reports his DOC as alcohol, and his last use was on 11/10/2018. The patient reports experiencing 3-trigger episodes this week brought on by having free-time and certain daily routines that reminded him of drinking. However, he reports being able to refrain from using. No signs or symptoms of intoxication or withdrawal observed.      Dimension #2 - Biomedical - Risk   Specific goals from treatment plan addressed this week:  1. Patient to maintain stable health throughout outpatient treatment.   2. Patient to report any changes in physical health to counselor.    3.Patient to take medications as prescribed   Effectiveness of strategies:  Effective;  The Pt reports having no current health or medical concerns. He reports being in good health and has a PCP.        Dimension #3 - Emotional/Behavioral/Cognitive - Risk   Specific goals from treatment plan addressed  this week:  1. Patient will address untreated MH symptoms  2. Patient will manage MH symptoms effectively to avoid future use of substances  3. Patient will develop coping skills to minimize impulsive behaviors                                                                                                                                                  4. Patient will schedule and complete an appointment for a Diagnostic Assessment and follow recommendations for treating MH symptoms  5. Patient to learn and begin using coping skills learned in CD treatment and/or therapy, and share in daily check-ins any benefits or challenges that you experience using these skills.  Effectiveness of strategies:  Effective; The patient rated his emotional/ MH as a 4 on a scale of 1-5 (with 5 being the highest/positive level). The patient indicates that he is staying positive and has no significant concerns at this time. The patient currently has no psychiatry or MH therapy services. He is being prescribed his psychotropic medication from his PCP.  The patient is scheduled to meet for a 1x1 counseling session next week on 11.27 at 5:00pm at which time, the possible benefits of his obtaining a MH therapist will be discussed.        Dimension #4 - Treatment Acceptance/Resistance - Risk   Specific goals from treatment plan addressed this week:  1. Patient to increase motivation towards recovery by participating in outpatient programming.   2. Patient will complete Use History assignment    3. Patient will complete Letter to his Drug of Choice and present in group for processing  Effectiveness of strategies:  Effective: The patient attended two days of group sessions this week. The patient will be attending 3-EOP sessions weekly as his wife is a NP and he needs to be home with their toddler on Monday evenings. The patient's external motivation for sobriety is his marriage and children. The patient is appropriately attentive, also  actively participates in the peer group process. The patient identified the changes he is willing to make to maintain his sobriety are, working on being more honest with himself, as well as, those that care about him. The patient contributed in a group worksheet discussion on Lifestyle Balance, also, Strengths Exploration worksheet this week with thoughtful responses.     Dimension #5 - Relapse Potential - Risk   Specific goals from treatment plan addressed this week:  1. Patient will learn to identify triggers and early warning signs of use/relapse to gain awareness of patterns of use.  2. Patient will complete comprehensive Relapse Prevention Plan prior to program completion.     3. Patient to share in daily check-in any urges and addictive thinking to better understand his pattern of use and to prevent relapse in the future.   4. Patient will learn positive coping skills to deal with stressors as they arise.  Effectiveness of strategies:  Effective; The patient reports he has avoided using by exercising, playing with his young son. The patient also disclosed to his group that he talked to his friends and colleagues at work about his addiction to alcohol and attending treatment. The patient indicated that some of the younger co-workers appeared to look like they were surprised, causing the Pt to feel somewhat judged. The patient appears to be well-grounded in his ability to accept accountability, while acknowledging that not everyone will understand and accept his disease of addiction. The patient listed the 3-things he is adding to his Relapse Prevention Plan are: 1. Weekly check-ins with his co-workers at work for support, 2. Check-in at home with his family 3. Sign-up for running and Ski races. The Pt will be reminded to make attending a minimum of one or more meetings per week a priority for ongoing support. Also, keeping an open mind to obtaining a sponsor.      Dimension #6 - Recovery Environment - Risk    Specific goals from treatment plan addressed this week:  1. Patient will pursue employment.  2. Patient will increase sober support options.  2. Patient will increase participation in sober, enjoyable leisure activities  4. Patient to begin attending AA meetings and establish a home group and sponsor.  Effectiveness of strategies:  Effective: The patient was educated on Sober Structure this week. Topics included, Importance of Sober Networking in Early Recovery, Sober Support Meetings, Sponsors, Priorities, Lifestyle Balance, Strengths Exploration, and Change Plan.  He reports his sober activities this week have been, MOA and [place] walking with his son, housework and cooking,  tasks around the house, and planning [place] events. The patient is  with a toddler son. He is employed full-time. The patient has no legal issues. The patient has minimal, if any sober support in the community. The patient will be encouraged to begin attending at least one sober support meeting weekly and create a social support system. The patient reports he is spending Thanksgiving celebrating with his and his wife's families. He reports being thankfor for second chances.      T) Treatment plan updated: no.  Patient notified and in agreement: NA.  Patient educated on Mental Health. Patient has [id number] of [id number] program hours at this time. Projected discharge date is 5/14/2018. Current discharge/transition plan is Recovery Maintenance programming.     Unique Rexseth [facility] [place]  11/15/2018        Psycho-Educational Curriculum  Date Attended  Psycho-Educational Curriculum  Date Attended          Acceptance   Shame/Guilt     1st Step   Anger/Rage           Affirmations   Mental Health     Automatic Negative Thoughts   Anxiety  11.14, 11.15   Cross Addiction   Depression 11.14, 11.15   Stages of Change   Co-Occurring Disorders 11.15     Alexa/Bipolar    Relapse   Trauma        Victim Identity       Stigma and MH 11.14     MH and  "Drug Effects 11.14, 11.15,     MH Benefits of Humor 11.14, 11.15,     MH Benefits of Exercise    Addictive Thoughts   CBT-Wise Mind         Catastrophizing 11.15     Mindfulness          Coping Skills   Sober Structure       Continuum of Care/Aftercare 11.19-11.20      Non-12 Step Support  11.19-11.20     Traditional support meetings 11.19, 11.20     Obtaining a Sponsor 11.20, 11.21     Goal setting 11.20, 11.21     Priorities  11.19-11.21   Relapse Prevention   What Does My Life Look Like (group worksheet)?      8--Dimensions of Wellness    Medical Aspects   Strengths Exploration 11.21   Brain/Neurotransmitters   Lifestyle Balance (w.sheet processing) 11.20     Community Resources      Bucket List      [facility] Hierarchy of Needs / Values    Medication Compliance   Spirituality  11.20, 11.21   [facility] Alcohol/Drug Research   Weekend Planner           Physical Health   Educational Videos     HIV/AIDS education      Post Acute Withdrawal   1st Step     Pregnancy and Drug Use   2nd Step     Sexual Health   Assertive Communication     Short-Term/Long-Term Effects   My name is Naren LEON      Orthodoxy vs. Spirituality    Relationships   Cross Addiction     Assertive Communication   God As We Understood Him     Boundaries   HBO Relapse     Codependence    HBO What Is Addiction     Defense Mechanisms    Medical Aspects 1     Family Roles   Medical Aspects 2     Goodbye Letter   [employer]: Stress     Intimacy   PBS Depression Out of the Shadows     Needs/Dealbreakers in Relationships   The Anonymous People    Socialization Skills   Sebastian       Stop Catastrophic Thinking        11.15,    Feelings   Luis Turner \"Highjacked Brain\"    ABC Model of Emotion   Mauricio Martin Humor in [place]    Grief and Loss   The Mindfulness Movie    Healthy vs. Unhealthy Feelings   Naren LEON documentary     Meditation/Mindfulness   Thursday Motivational Video 11.15, 11.20   Overconfidence/Complacency       Resentments       Stress           "

## 2021-06-21 NOTE — PROGRESS NOTES
Herminio Gloria attended 3 hours of group therapy today.    Total group size of 3.    11/27/2018 9:08 PM Unique Landis

## 2021-06-21 NOTE — PROGRESS NOTES
Herminio Gloria attended 3 hours of group therapy today.    Total group size of 7.    11/14/2018 9:22 PM Unique Landis

## 2021-06-21 NOTE — PROGRESS NOTES
Herminio Gloria attended 3 hours of group therapy today.    Total group size of 8    11/27/2018 9:13 PM Unique Landis

## 2021-06-21 NOTE — LETTER
Letter by Carmina Bennett CNP at      Author: Carmina Bennett CNP Service: -- Author Type: --    Filed:  Encounter Date: 2/10/2021 Status: (Other)         [facility]  02/10/21    Patient: Herminio Gloria  YOB: 1980  Medical Record Number: [id number]  CSN: [id number]                                                                              Non-opioid Controlled Substance Agreement    I understand that my care provider has prescribed a controlled substance to help manage my condition(s). I am taking this medicine to help me function or work. I know this is strong medicine, and that it can cause serious side effects. Controlled substances can be sedating, addicting and may cause a dependency on the drug. They can affect my ability to drive or think, and cause depression. They need to be taken exactly as prescribed. Combining controlled substances with certain medicines or chemicals (such as cocaine, sedatives and tranquilizers, sleeping pills, meth) can be dangerous or even fatal. Also, if I stop controlled substances suddenly, I may have severe withdrawal symptoms.  If not helpful, I may be asked to stop them.    The risks, benefits, and side effects of these medicine(s) were explained to me. I agree that:    1. I will take part in other treatments as advised by my care team. This may be psychiatry or counseling, physical therapy, behavioral therapy, group treatment or a referral to a pain clinic. I will reduce or stop my medicine when my care team tells me to do so.  2. I will take my medicines as prescribed. I will not change the dose or schedule unless my care team tells me to. There will be no refills if I run out early.  I may be contactedwithout warning and asked to complete a urine drug test or pill count at any time.   3. I will keep all my appointments, and understand this is part of the monitoring of controlled substances. My care team may  require an office visit for EVERY controlled substance refill. If I miss appointments or dont follow instructions, my care team may stop my medicine.  4. I will not ask other providers to prescribe controlled substances, and I will not accept controlled substances from other people. If I need another prescribed controlled substance for a new reason, I will tell my care team within 1 business day.  5. I will use one pharmacy to fill all of my controlled substance prescriptions, and it is up to me to make sure that I do not run out of my medicines on weekends or holidays. If my care team is willing to refill my controlled substance prescription without a visit, I must request refills only during office hours, refills may take up to 3 days to process, and it may take up to 5 to 7 days for my medicine to be mailed and ready at my pharmacy. Prescriptions will not be mailed anywhere except my pharmacy.    6. I am responsible for my prescriptions. If the medicine/prescription is lost or stolen, it will not be replaced. I also agree not to share controlled substance medicines with anyone.          [facility]  02/10/21  Patient:  Herminio Gloria  YOB: 1980  Medical Record Number: [id number]  CSN: [id number]    7. I agree to not use ANY illegal or recreational drugs. This includes marijuana, cocaine, bath salts or other drugs. I agree not to use alcohol unless my care team says I may. I agree to give urine samples whenever asked. If I dont give a urine sample, the care team may stop my medicine.    8. If I enroll in the [place] Medical Marijuana program, I will tell my care team. I will also sign an agreement to share my medical records with my care team.    9. I will bring in my list of medicines (or my medicine bottles) each time I come to the clinic.   10. I will tell my care team right away if I become pregnant or have a new medical problem treated outside of my regular  clinic.  11. I understand that this medicine can affect my thinking and judgment. It may be unsafe for me to drive, use machinery and do dangerous tasks. I will not do any of these things until I know how the medicine affects me. If my dose changes, I will wait to see how it affects me. I will contact my care team if I have concerns about medicine side effects.    I understand that if I do not follow any of the conditions above, my prescriptions or treatment may be stopped.      I agree that my provider, clinic care team, and pharmacy may work with any city, state or federal law enforcement agency that investigates the misuse, sale, or other diversion of my controlled medicine. I will allow my provider to discuss my care with or share a copy of this agreement with any other treating provider, pharmacy or emergency room where I receive care. I agree to give up (waive) any right of privacy or confidentiality with respect to these consents.   I have read this agreement and have asked questions about anything I did not understand.    ___________________________________________________________________________  Patient signature - Date/Time  -Herminio Gloria                                      ___________________________________________________________________________  Witness signature                                                                    ___________________________________________________________________________  Provider signature- Carmina Bennett, CNP

## 2021-06-22 NOTE — PROGRESS NOTES
Herminio Gloria attended 2 hours of group therapy today.    Total group size of 10.    1/2/2019 9:29 PM Unique Landis

## 2021-06-22 NOTE — PROGRESS NOTES
Weekly Progress Note   Herminio Gloria  BRITTANY; 1980  MRN; [id number]      D) Pt attended 2 groups this week with 1 absences. Patient attended 1 family session this week. Patient is scheduled to attend EOP 3 times weekly.  A) Staff facilitated groups and reviewed tx progress. [facility] for [facility].   R) No VAP needed at this time.   Any significant events, defined as events that impact patients relationship with others inside and outside of treatment: No  Indicate any changes or monitoring of physical or mental health problems: Yes. Patient is currently meeting with Danni     Indicate involvement by any outside supports: Yes. The patients wife has become actively involved with his treatment process and planning.  IAPP reviewed and modified as needed: NA  Pt working on the following dimensions:    Dimension #1 - Withdrawal Potential - Risk   Specific goals from treatment plan addressed this week:  1. Patient to maintain abstinence throughout outpatient treatment.   2. Patient to report any substance/alcohol use to counselor to determine if any changes need to be made to address withdrawal symptoms.   3. Patient to complete any requested UAs.   Effectiveness of strategies:  Effective; The patient reports his DOC as alcohol, and reported he had another relapse on 2018. The patient reports he is currently experiencing triggers and cravings on the weekends. Patient has admitted to using, therefore he was not sent to the lab for UA testing..      Dimension #2 - Biomedical - Risk   Specific goals from treatment plan addressed this week:  1. Patient to maintain stable health throughout outpatient treatment.   2. Patient to report any changes in physical health to counselor.    3.Patient to take medications as prescribed   Effectiveness of strategies:  Effective;  The Pt reports no biomedical issues. The patient reports his PCP is Dr. Herrera (not sure of spelling) at [facility]. Pt reports his last exam was in the  "spring of 2018. Patient reports he is now being prescribed Naltrexone for cravings.    Dimension #3 - Emotional/Behavioral/Cognitive - Risk   Specific goals from treatment plan addressed this week:  1. Patient will address untreated MH symptoms  2. Patient will manage MH symptoms effectively to avoid future use of substances  3. Patient will develop coping skills to minimize impulsive behaviors                                                                                                                             4. Patient will schedule and complete an appointment for a Diagnostic Assessment and follow recommendations for treating MH symptoms  5. Patient to learn and begin using coping skills learned in CD treatment and/or therapy, and share in daily check-ins any benefits or challenges that you experience using these skills.  Effectiveness of strategies:  Effective; The patient reports being a 4 on a scale of 1-10 regarding how confident he currently feels about being able to maintain his sobriety. The Pt reports that after his meeting with counselor and his wife this week, he felt some relief.  The patient completed a MH DA in [facility] on 12.21.18 and now has a therapist with [facility] (Danni).  The patient reports his high point of the week has been \"having a new focus,\" and his low point was relapsing last Saturday, 12.29.18. The patient was educated on Mental Health this week. Topics included: Addressing the Stigma of MH, types of depression and anxiety disorders, Trauma & PTSD, Dual diagnosis, Victim Identity, MH Benefits of Humor also, Exercise, Benefits of MH therapy. The patient reports that being depressed, anxious and angry is a trigger for him because those emotions make him want to escape.                                                                                                                                    Dimension #4 - Treatment Acceptance/Resistance - Risk   Specific goals from " treatment plan addressed this week:  1. Patient to increase motivation towards recovery by participating in outpatient programming.   2. Patient will complete Use History assignment    3. Patient will complete Letter to his Drug of Choice and present in group for processing  Effectiveness of strategies:  Effective: Pt attended two EOP sessions this week. He was excused from group session after meeting with counselor and his wife to go home to discuss plan going forward. At the time of the couples session, writer agreed to the patients being given another chance to remain in EOP with conditions, however that would be contingent on recommendations made in staff meeting the following day. The patient was also given an abstinence agreement to sign. On 1.4.19, the staff and supervisor collectively agreed that the patient had relapsed 3x while in EOP, which suggests that the patient continues to have impulse control issues, as he continues to use despite consequences. The patient would benefit by receiving a higher level of care until stabalized.    Dimension #5 - Relapse Potential - Risk   Specific goals from treatment plan addressed this week:  1. Patient will learn to identify triggers and early warning signs of use/relapse to gain awareness of patterns of use.  2. Patient will complete comprehensive Relapse Prevention Plan prior to program completion.     3. Patient to share in daily check-in any urges and addictive thinking to better understand his pattern of use and to prevent relapse in the future.   4. Patient will learn positive coping skills to deal with stressors as they arise.  Effectiveness of strategies:  Ineffective; The patient verbalizes a desire learn how to live a life in recovery, however, admits that he is torn and struggles with accepting that he has to give up drinking as a life long lifestyle. The patient has been unable to remain abstinent. He has reported to relapsing three separate time since  beginning EOP on 11.14.2018. The patient is currently being recommended for a higher level of care.     Dimension #6 - Recovery Environment - Risk   Specific goals from treatment plan addressed this week:  1. Patient will pursue employment.  2. Patient will increase sober support options.  2. Patient will increase participation in sober, enjoyable leisure activities  4. Patient to begin attending AA meetings and establish a home group and sponsor.  Effectiveness of strategies:  Effective: The patient owns his home with his wife and minor son. The patients wife and his family are supportive of his becoming and staying sober. The patient reports he has arranged to take a leave of absence from his job to focus on recovery. He is being discharged to a higher level of care.    T) Treatment plan updated: no.  Patient notified and in agreement: NA.  Patient educated on Mental Health. Patient has completed 55 of 84 program hours at this time. The patient was discharged with recommendation for a higher Level of Care on 1.4.2019.   Unique Landis [facility]  1/4/2018        Psycho-Educational Curriculum  Date Attended  Psycho-Educational Curriculum  Date Attended          Acceptance  11.27-11.29 Shame/Guilt     1st Step  11.27-11.29 Anger/Rage     Powerlessness (Surrendering) vs. Empowerment 11.27-11.29     How Acceptance Heals 11.27, 11.28     Affirmations  11.27-11.29 Mental Health     Automatic Negative Thoughts  11.28 Anxiety  11.14, 11.15, 12.31   Cross Addiction  11.28 Depression 11.14, 11.15   Stages of Change  11.27, 11.28 Co-Occurring Disorders 11.15, 12.31   Self-Image / Self-Esteem 11.29 Alexa/Bipolar 12.31   Relapse   Trauma   12.31   Addictive Thoughts, Triggers, Craving Process  12.4-12.6 Victim Identity     Triggers/ High Risk Situations 12.5, 12.6 Stigma and MH 11.14, 1.3,   Relapse Process 12.4-12.6,  MH and Drug Effects 11.14, 11.15, 12.31   Warning Signs of Relapse 12.5, 12.6 MH Benefits of Humor 11.14, 11.15, 1.2  "  Recovering from Relapse 12.6 MH Benefits of Exercise 1.2   Relapse Prevention  12.4-,12.6 CBT-Wise Mind       Coping Skills  12.4-12.6,  Catastrophizing 11.15   Stinking Thinking 12.4 Mindfulness    Emergency Contact Card 12.4     PAWS 12.5 Sober Structure       Continuum of Care/Aftercare 11.19-11.20      Non-12 Step Support  11.19-11.20     Traditional support meetings 11.19, 11.20     Obtaining a Sponsor 11.20, 11.21     Goal setting 11.20, 11.21     Priorities  11.19-11.21   Relapse Prevention   What Does My Life Look Like (group worksheet)?      8--Dimensions of Wellness    Medical Aspects   Strengths Exploration 11.21   Brain/Neurotransmitters  12.13 Lifestyle Balance (w.sheet processing) 11.20   Self-Care Assessment Worksheet 12.12 Bucket List    Medication Compliance   Spirituality  11.20, 11.21   LV Alcohol/Drug Research  12.13 Weekend Planner           Physical Health  12.11, 12.13 Educational Videos     HIV/AIDS education      Post Acute Withdrawal  12.12 1st Step     Pregnancy and Drug Use   2nd Step     Sexual Health  12.13 Assertive Communication     Short-Term/Long-Term Effects  12.11, 12.13 My name is Naren LEON      Congregation vs. Spirituality    Relationships   Cross Addiction     Assertive Communication   God As We Understood Him     Boundaries  12.26 HBO Relapse     Codependence   12.26 HBO What Is Addiction     Defense Mechanisms    Medical Aspects 1     Family Roles   Medical Aspects 2     Goodbye Letter   National Geographic: Stress     Intimacy   PBS Depression Out of the Shadows     Needs/Dealbreakers in Relationships  12.26 The Anonymous People    Socialization Skills  12.27 [place]     Core Values 12.27 Stop Catastrophic Thinking        11.15,         Feelings   Luis Turner \"Highjacked Brain\"    ABC Model of Emotion   Mauricio Martin Humor in Tx    Grief and Loss  12.20 The Mindfulness Movie    Healthy vs. Unhealthy Feelings  12.20 Naren LEON documentary     Meditation/Mindfulness   Thursday " Motivational Video 11.15, 11.20, 11. 29, 12.6, 12.13, 12.20. 12.27   Overconfidence/Complacency  12.20     Resentments       Stress

## 2021-06-22 NOTE — PROGRESS NOTES
"Weekly Progress Note   Herminio Gloria  BRITTANY; 1980  MRN; [id number]      D) Pt attended 3 groups this week with 0 absences. Patient attended 1 individual sessions this week. Patient is scheduled to attend EOP 3 times weekly.  A) Staff facilitated groups and reviewed tx progress. [facility] for [facility].   R) No VAP needed at this time.   Any significant events, defined as events that impact patients relationship with others inside and outside of treatment: No  Indicate any changes or monitoring of physical or mental health problems: No    Indicate involvement by any outside supports: No  IAPP reviewed and modified as needed: NA  Pt working on the following dimensions:    Dimension #1 - Withdrawal Potential - Risk   Specific goals from treatment plan addressed this week:  1. Patient to maintain abstinence throughout outpatient treatment.   2. Patient to report any substance/alcohol use to counselor to determine if any changes need to be made to address withdrawal symptoms.   3. Patient to complete any requested UAs.   Effectiveness of strategies:  Effective; The patient reports his DOC as alcohol, and admitted to relapsing on the weekend of 2018. The patient reports experiencing triggers this week and was able to refrain from using. No signs or symptoms of intoxication or observed while in EOP.      Dimension #2 - Biomedical - Risk   Specific goals from treatment plan addressed this week:  1. Patient to maintain stable health throughout outpatient treatment.   2. Patient to report any changes in physical health to counselor.    3.Patient to take medications as prescribed   Effectiveness of strategies:  Effective;  The Pt reports his physical health is \"great\" this week. He reports he has a PCP.        Dimension #3 - Emotional/Behavioral/Cognitive - Risk   Specific goals from treatment plan addressed this week:  1. Patient will address untreated MH symptoms  2. Patient will manage MH symptoms effectively to avoid " "future use of substances  3. Patient will develop coping skills to minimize impulsive behaviors                                                                                                                             4. Patient will schedule and complete an appointment for a Diagnostic Assessment and follow recommendations for treating MH symptoms  5. Patient to learn and begin using coping skills learned in CD treatment and/or therapy, and share in daily check-ins any benefits or challenges that you experience using these skills.  Effectiveness of strategies:  Effective; The patient rated his emotional/ MH as a 3.5 on a scale of 1-5 (with 5 being the highest/positive level). The patient reports being a \"little fragile after drinking over the past weekend.\"  The patient currently has no psychiatry or MH therapy services. He is being prescribed his psychotropic medication from his PCP.  The patient met with counselor for a 1x1 session om 11/27/18. The patient reports he may be interested in seeking couples counseling in the future for him and his wife. The patient also met with counselor on 12.3.18 to report that he relapsed over the weekend and to discuss his concerns that it is affecting his marriage. The patient reported that his wife will not allow him to be alone with his son until she is convinced that he is serious about remaining abstinent. The patient reports an interest in obtaining a MH health therapist in the [facility].       Dimension #4 - Treatment Acceptance/Resistance - Risk   Specific goals from treatment plan addressed this week:  1. Patient to increase motivation towards recovery by participating in outpatient programming.   2. Patient will complete Use History assignment    3. Patient will complete Letter to his Drug of Choice and present in group for processing  Effectiveness of strategies:  Effective: The patient was educated on Acceptance this week. Topics included; How Acceptance Heals, Step 1 " "of the Twelve Steps, Powerlessness (Surrendering) vs. Empowerment, Affirmations, Use History and Consequences, Personal Responsibility, Stages of Change, Cross Addiction, Self-Image/ Self Esteem, Journaling. The patient is currently scheduled to attend EOP 3-sessions weekly due to not having a sitter while his wife works on Monday nights as a NP. He has attended all scheduled sessions since beginning EOP. The patient is an active participant in his group process sessions, willing to share insights from his own life experiences to offer another perspective to relevant conversations on substance use and consequences. The patient reports his most sincere affirmation for himself this week is \" I am capable.\"     Dimension #5 - Relapse Potential - Risk   Specific goals from treatment plan addressed this week:  1. Patient will learn to identify triggers and early warning signs of use/relapse to gain awareness of patterns of use.  2. Patient will complete comprehensive Relapse Prevention Plan prior to program completion.     3. Patient to share in daily check-in any urges and addictive thinking to better understand his pattern of use and to prevent relapse in the future.   4. Patient will learn positive coping skills to deal with stressors as they arise.  Effectiveness of strategies:  Effective; The patient called counselor on 12.3.18 to meeting with counselor. The patient came into the office within 20 minutes of his calling. He admitted to counselor that he had relapsed on alcohol over the weekend. The patient appeared to have had little sleep and was unshaven, though did not appear intoxicated. Patient was counseled on the causes of hs lapse and what he intends to do going forward. The patient revealed that he had not been paying much attention to his homework assignments and that he is preparing to put more effort into his program of recovery. The patient identified himself as a 7 on a scale of 1-10 regarding his " "confidence in his sober stability this week. He identified that rating because \" I  Am feeling positive but cautious. \" the patient is reminded and encouraged to stay focused on one day at a time.\" The patient appears to be struggling with the concepts of recovery from addiction.      Dimension #6 - Recovery Environment - Risk   Specific goals from treatment plan addressed this week:  1. Patient will pursue employment.  2. Patient will increase sober support options.  2. Patient will increase participation in sober, enjoyable leisure activities  4. Patient to begin attending AA meetings and establish a home group and sponsor.  Effectiveness of strategies:  Effective: The patient owns his home with his wife and minor son. He reports it is a safe and sober environment, and that his wife is supportive of his ongoing recovery. He works full time and takes care of his child when his wife works at night. The patient The patient reports his two goals for the week are to work on EOP assignments, and spend a lot of time with his family.The patient reports he has not attended support meetings due to his work and EOP schedule. The patient is encouraged to find a weekend meeting to begin attending for support.      T) Treatment plan updated: no.  Patient notified and in agreement: NA.  Patient educated on Relapse. Patient has completed 32 of 84 program hours at this time. Projected discharge date is 5/14/2018. Current discharge/transition plan is Recovery Maintenance programming.     Unique Boby [facility]  12/7/2018        Psycho-Educational Curriculum  Date Attended  Psycho-Educational Curriculum  Date Attended          Acceptance  11.27-11.29 Shame/Guilt     1st Step  11.27-11.29 Anger/Rage     Powerlessness (Surrendering) vs. Empowerment 11.27-11.29     How Acceptance Heals 11.27, 11.28     Affirmations  11.27-11.29 Mental Health     Automatic Negative Thoughts  11.28 Anxiety  11.14, 11.15   Cross Addiction  11.28 Depression " 11.14, 11.15   Stages of Change  11.27, 11.28 Co-Occurring Disorders 11.15   Self-Image / Self-Esteem 11.29 Alexa/Bipolar    Use History and Consequences 11.29     Personal Responsibility 11.28, 11.29           Relapse   Trauma      Addictive Thoughts, Triggers, Craving Process  12.4-12.6 Victim Identity     Triggers/ High Risk Situations 12.5, 12.6 Stigma and MH 11.14   Relapse Process 12.4-12.6,  MH and Drug Effects 11.14, 11.15,   Warning Signs of Relapse 12.5, 12.6 MH Benefits of Humor 11.14, 11.15,   Recovering from Relapse 12.6 MH Benefits of Exercise    Relapse Prevention  12.4-,12.6 CBT-Wise Mind       Coping Skills  12.4-12.6,  Catastrophizing 11.15   Stinking Thinking 12.4 Mindfulness    Emergency Contact Card 12.4     PAWS 12.5 Sober Structure       Continuum of Care/Aftercare 11.19-11.20      Non-12 Step Support  11.19-11.20     Traditional support meetings 11.19, 11.20     Obtaining a Sponsor 11.20, 11.21     Goal setting 11.20, 11.21     Priorities  11.19-11.21   Relapse Prevention   What Does My Life Look Like (group worksheet)?      8--Dimensions of Wellness    Medical Aspects   Strengths Exploration 11.21   Brain/Neurotransmitters   Lifestyle Balance (w.sheet processing) 11.20     Community Resources      Bucket List      [facility] Hierarchy of Needs / Values    Medication Compliance   Spirituality  11.20, 11.21   LV Alcohol/Drug Research   Weekend Planner           Physical Health   Educational Videos     HIV/AIDS education      Post Acute Withdrawal   1st Step     Pregnancy and Drug Use   2nd Step     Sexual Health   Assertive Communication     Short-Term/Long-Term Effects   My name is Naren W.      [place] vs. Spirituality    Relationships   Cross Addiction     Assertive Communication   God As We Understood Him     Boundaries   HBO Relapse     Codependence    HBO What Is Addiction     Defense Mechanisms    Medical Aspects 1     Family Roles   Medical Aspects 2     Goodbye Letter   National  "Geographic: Stress     Intimacy   PBS Depression Out of the Shadows     Needs/Dealbreakers in Relationships   The Anonymous People    Socialization Skills   [place]       Stop Catastrophic Thinking        11.15,    Feelings   Luis Turner \"Highjacked Brain\"    ABC Model of Emotion   Mauricio Martin Humor in [place]    Grief and Loss   The Mindfulness Movie    Healthy vs. Unhealthy Feelings   Naren LEON documentary     Meditation/Mindfulness   Thursday Motivational Video 11.15, 11.20, 11. 29, 12.6   Overconfidence/Complacency       Resentments       Stress           "

## 2021-06-22 NOTE — PROGRESS NOTES
"Weekly Progress Note - Late entry for 18  Herminio Gloria  ; 1980  MRN; [id number]      D) Pt attended 3 groups this week with 0 absences. Patient attended 1 individual sessions this week. Patient is scheduled to attend EOP 3 times weekly.  A) Staff facilitated groups and reviewed tx progress. Ass[facility] for [facility].   R) No VAP needed at this time.   Any significant events, defined as events that impact patients relationship with others inside and outside of treatment: No  Indicate any changes or monitoring of physical or mental health problems: No    Indicate involvement by any outside supports: No  IAPP reviewed and modified as needed: NA  Pt working on the following dimensions:    Dimension #1 - Withdrawal Potential - Risk   Specific goals from treatment plan addressed this week:  1. Patient to maintain abstinence throughout outpatient treatment.   2. Patient to report any substance/alcohol use to counselor to determine if any changes need to be made to address withdrawal symptoms.   3. Patient to complete any requested UAs.   Effectiveness of strategies:  Effective; The patient reports his DOC as alcohol, and his last use was on 11/10/2018. The patient reports experiencing 1-trigger episode this week and was able to refrain from using. No signs or symptoms of intoxication or withdrawal observed.      Dimension #2 - Biomedical - Risk   Specific goals from treatment plan addressed this week:  1. Patient to maintain stable health throughout outpatient treatment.   2. Patient to report any changes in physical health to counselor.    3.Patient to take medications as prescribed   Effectiveness of strategies:  Effective;  The Pt reports his physical health is \"great\" this week. He reports he has a PCP.        Dimension #3 - Emotional/Behavioral/Cognitive - Risk   Specific goals from treatment plan addressed this week:  1. Patient will address untreated MH symptoms  2. Patient will manage MH symptoms effectively " to avoid future use of substances  3. Patient will develop coping skills to minimize impulsive behaviors                                                                                                                             4. Patient will schedule and complete an appointment for a Diagnostic Assessment and follow recommendations for treating MH symptoms  5. Patient to learn and begin using coping skills learned in CD treatment and/or therapy, and share in daily check-ins any benefits or challenges that you experience using these skills.  Effectiveness of strategies:  Effective; The patient rated his emotional/ MH as a 4.5 on a scale of 1-5 (with 5 being the highest/positive level). The patient reports he had a great Thanksgiving weekend with his family. The patient currently has no psychiatry or MH therapy services. He is being prescribed his psychotropic medication from his PCP.  The patient met with counselor for a 1x1 session om 11/27/18. The patient reports he may be interested in seeking couples counseling in the future for him and his wife. The patient reports having a good marriage and happy family life, however, sometime has difficulty with how he and his wife communicate with each other. Counselor will follow up.      Dimension #4 - Treatment Acceptance/Resistance - Risk   Specific goals from treatment plan addressed this week:  1. Patient to increase motivation towards recovery by participating in outpatient programming.   2. Patient will complete Use History assignment    3. Patient will complete Letter to his Drug of Choice and present in group for processing  Effectiveness of strategies:  Effective: The patient was educated on Acceptance this week. Topics included; How Acceptance Heals, Step 1 of the Twelve Steps, Powerlessness (Surrendering) vs. Empowerment, Affirmations, Use History and Consequences, Personal Responsibility, Stages of Change, Cross Addiction, Self-Image/ Self Esteem, Journaling.  "The patient is currently scheduled to attend EOP 3-sessions weekly due to not having a sitter while his wife works on Monday nights as a NP. He has attended all scheduled sessions since beginning EOP. The patient is an active participant in his group process sessions, willing to share insights from his own life experiences to offer another perspective to relevant conversations on substance use and consequences. The patient reports his most sincere affirmation for himself this week is \" I am capable.\"     Dimension #5 - Relapse Potential - Risk   Specific goals from treatment plan addressed this week:  1. Patient will learn to identify triggers and early warning signs of use/relapse to gain awareness of patterns of use.  2. Patient will complete comprehensive Relapse Prevention Plan prior to program completion.     3. Patient to share in daily check-in any urges and addictive thinking to better understand his pattern of use and to prevent relapse in the future.   4. Patient will learn positive coping skills to deal with stressors as they arise.  Effectiveness of strategies:  Effective; The patient reports he has avoided using by running a 10k, yard work, and playing with his young son. The patient reports his co-workers, close friends, and family members are supportive of his ongoing recovery. He reports that his coworkers and some others have mentioned that he appears to be healthier, happier, and more present tan he was a little over a month ago. The patient reports that he is in the Butterfly Stage of Change. It appears that the patient is transitioning from the contemplation to the Preparation Stage of Change, however, he is encouraged to begin attending support meetings each week a priority for ongoing support. Also, keeping an open mind to obtaining a sponsor. The patient reports his two-main goals are 1. Stay sober, 2. Stay on time at work.      Dimension #6 - Recovery Environment - Risk   Specific goals from " treatment plan addressed this week:  1. Patient will pursue employment.  2. Patient will increase sober support options.  2. Patient will increase participation in sober, enjoyable leisure activities  4. Patient to begin attending AA meetings and establish a home group and sponsor.  Effectiveness of strategies:  Effective: The patient owns his home with his wife and minor son. He reports it is a safe and sober environment, and that his wife is supportive of his ongoing recovery. He works full time and takes care of his child when his wife works at night. The patient reports he is preparing and looking forward to enjoying a sober [place] with his family this year. The patient reports he is most grateful to himself for his ability to accomplish goals.      T) Treatment plan updated: no.  Patient notified and in agreement: NA.  Patient educated on Mental Health. Patient has completed 22 of 84 program hours at this time. Projected discharge date is 5/14/2018. Current discharge/transition plan is Recovery Maintenance programming.     Unique Landis [facility]  11/30/2018        Psycho-Educational Curriculum  Date Attended  Psycho-Educational Curriculum  Date Attended          Acceptance  11.27-11.29 Shame/Guilt     1st Step  11.27-11.29 Anger/Rage     Powerlessness (Surrendering) vs. Empowerment 11.27-11.29     How Acceptance Heals 11.27, 11.28     Affirmations  11.27-11.29 Mental Health     Automatic Negative Thoughts  11.28 Anxiety  11.14, 11.15   Cross Addiction  11.28 Depression 11.14, 11.15   Stages of Change  11.27, 11.28 Co-Occurring Disorders 11.15   Self-Image / Self-Esteem 11.29 [place]/Bipolar    Use History and Consequences 11.29     Personal Responsibility 11.28, 11.29           Relapse   Trauma        Victim Identity       Stigma and MH 11.14     MH and Drug Effects 11.14, 11.15,     MH Benefits of Humor 11.14, 11.15,     MH Benefits of Exercise    Addictive Thoughts   CBT-Wise Mind         Catastrophizing 11.15  "    Mindfulness          Coping Skills   Sober Structure       Continuum of Care/Aftercare 11.19-11.20      Non-12 Step Support  11.19-11.20     Traditional support meetings 11.19, 11.20     Obtaining a Sponsor 11.20, 11.21     Goal setting 11.20, 11.21     Priorities  11.19-11.21   Relapse Prevention   What Does My Life Look Like (group worksheet)?      8--Dimensions of Wellness    Medical Aspects   Strengths Exploration 11.21   Brain/Neurotransmitters   Lifestyle Balance (w.sheet processing) 11.20     Community Resources      Bucket List      [facility]'s Hierarchy of Needs / Values    Medication Compliance   Spirituality  11.20, 11.21   LV Alcohol/Drug Research   Weekend Planner           Physical Health   Educational Videos     HIV/AIDS education      Post Acute Withdrawal   1st Step     Pregnancy and Drug Use   2nd Step     Sexual Health   Assertive Communication     Short-Term/Long-Term Effects   My name is Naren LEON      Pentecostalism vs. Spirituality    Relationships   Cross Addiction     Assertive Communication   God As We Understood Him     Boundaries   HBO Relapse     Codependence    HBO What Is Addiction     Defense Mechanisms    Medical Aspects 1     Family Roles   Medical Aspects 2     Goodbye Letter   National Geographic: Stress     Intimacy   PBS Depression Out of the Shadows     Needs/Dealbreakers in Relationships   The Anonymous People    Socialization Skills   [place]       Stop Catastrophic Thinking        11.15,    Feelings   Luis Turner \"Highjacked Brain\"    ABC Model of Emotion   Mauricio Martin Humor in Tx    Grief and Loss   The Mindfulness Movie    Healthy vs. Unhealthy Feelings   Naren LEON documentary     Meditation/Mindfulness   Thursday Motivational Video 11.15, 11.20   Overconfidence/Complacency       Resentments       Stress           "

## 2021-06-22 NOTE — PROGRESS NOTES
Herminio Gloria attended 3 hours of group therapy today.    Total group size of 8.    12/12/2018 9:10 PM Unique Landis

## 2021-06-22 NOTE — PROGRESS NOTES
1x1 Counseling Session - late entry for 12/4/2018 (4:30-5:30 1 hr)    Received a phone call from the patient requesting to come in and meet with this writer. Upon his arrival, the patient reported that he had relapsed over the past weekend. He reported that he stopped to pick his wife up a bottle of wine as a thoughtful gesture, and also bought a small bottle of liquor for himself. The patient went on to say that he drank that night and the next day without his wife's knowledge, however she eventually realized that he was intoxicated, resulting in her becoming upset. The patient appeared sincere in expression of shame and guilt for having relapsed. Writer then inquired what was the patient's thought process leading up to the moment he entered the liquor store to when he consciously decided to purchase the bottle of liquor for himself. The patient did not reply directly to the question, and did respond that he probably should have thought about the consequences first. Writer encouraged him to be fully honest about his actions in order that he learn from this experience.  Also, inquired what he is willing to do going forward to avoid another slip. The patient reported that his wife suggested that he consider going to inpatient. He also informed that he was contacted by his employers to schedule a meeting to discuss the patient's admitted relapse. The patient reports that he did inform his colleagues and management at his job that he entered EOP TX two weeks ago. He described that the reactions he received from the younger co-workers was a mixed reaction. The patient went on to say that he informed them that he is open to any questions.  Writer reminded the patient that his relapse did not begin at that moment in the liquor store, and that it is necessary for him to pay close attention to red flags and triggers that lead to craving and warn of pending relapse.  Writer also encouraged the patient to share this event and  his feelings about it with his EOP group peers. He agreed.   The patient admitted that he is struggling with the notion that recovery means being abstinent from alcohol use for the rest of his life. Patient was commended for coming forward and asking for help, and was reminded that he would benefit more by reaching out when he is having triggers or cravings.

## 2021-06-22 NOTE — PROGRESS NOTES
Weekly Progress Note   Herminio Gloria  BRITTANY; 1980  MRN; [id number]      D) Pt attended 3 groups this week with 0 absences. Patient attended 0 individual sessions this week. Patient is scheduled to attend EOP 3 times weekly.  A) Staff facilitated groups and reviewed tx progress. Assessed for [facility].   R) No VAP needed at this time.   Any significant events, defined as events that impact patients relationship with others inside and outside of treatment: No  Indicate any changes or monitoring of physical or mental health problems: No    Indicate involvement by any outside supports: No  IAPP reviewed and modified as needed: NA  Pt working on the following dimensions:    Dimension #1 - Withdrawal Potential - Risk   Specific goals from treatment plan addressed this week:  1. Patient to maintain abstinence throughout outpatient treatment.   2. Patient to report any substance/alcohol use to counselor to determine if any changes need to be made to address withdrawal symptoms.   3. Patient to complete any requested UAs.   Effectiveness of strategies:  Effective; The patient reports his DOC as alcohol, and reported his last use was on the weekend of 2018. The patient reports experiencing triggers this week and was able to refrain from using. No signs or symptoms of intoxication or observed while in EOP.      Dimension #2 - Biomedical - Risk   Specific goals from treatment plan addressed this week:  1. Patient to maintain stable health throughout outpatient treatment.   2. Patient to report any changes in physical health to counselor.    3.Patient to take medications as prescribed   Effectiveness of strategies:  Effective;  The Pt reports no health or medical concerns or issues this week. He reports he has a PCP. Also, reports his last exam was in the spring of 2018. This week, patient was educated on Medical Aspects. Topics included, LV Alcohol/Drug Research, Short-Term/Long-Term Effects of Drugs on  Brain/Neurotransmitters, Physical and Sexual Health, PAWS, Medication Compliance and Self-Care Assessment.      Dimension #3 - Emotional/Behavioral/Cognitive - Risk   Specific goals from treatment plan addressed this week:  1. Patient will address untreated MH symptoms  2. Patient will manage MH symptoms effectively to avoid future use of substances  3. Patient will develop coping skills to minimize impulsive behaviors                                                                                                                             4. Patient will schedule and complete an appointment for a Diagnostic Assessment and follow recommendations for treating MH symptoms  5. Patient to learn and begin using coping skills learned in CD treatment and/or therapy, and share in daily check-ins any benefits or challenges that you experience using these skills.  Effectiveness of strategies:  Effective; The patient rated his emotional/ MH as a 4 on a scale of 1-5 (with 5 being the highest/positive level). The patient reports he didn't have a very restful weekend, though is currently feeling more good. The patient is now scheduled to meet with [facility] therapist Dai Vega for a DA/ intake on 12/21/18 at 10:00 a.m. The patient is also looking to find a PCP that can prescribe him Naltrexone. The patient reports his high stress areas as work can be a high stress, holiday events getting busy, also he indicated that a lot is expected of him sometimes. The patient is high energy personality type. He appears to be highly motivated to be a good parent and , as well as, a team player at work and in his EOP group sessions.                                                                                                                                   Dimension #4 - Treatment Acceptance/Resistance - Risk   Specific goals from treatment plan addressed this week:  1. Patient to increase motivation towards recovery by  "participating in outpatient programming.   2. Patient will complete Use History assignment    3. Patient will complete Letter to his Drug of Choice and present in group for processing  Effectiveness of strategies:  Effective: The patient is an active participant in his group process sessions, willing to share insights from his own life experiences to offer another perspective to relevant conversations on substance use and consequences. He is also supportive and encouraging to his peers.  The patient reports he is working on homework assignments given to him upon entering EOP.     Dimension #5 - Relapse Potential - Risk   Specific goals from treatment plan addressed this week:  1. Patient will learn to identify triggers and early warning signs of use/relapse to gain awareness of patterns of use.  2. Patient will complete comprehensive Relapse Prevention Plan prior to program completion.     3. Patient to share in daily check-in any urges and addictive thinking to better understand his pattern of use and to prevent relapse in the future.   4. Patient will learn positive coping skills to deal with stressors as they arise.  Effectiveness of strategies:  Effective; The patient reports he has been staying active with work, family and holiday family events which helps him avoid isolating. The patient reported on his check-in sheet that the changes he is willing to make for long-term recovery are \"change work and friends.\" The patient will be asked to clarify what that means. The patient reports he has not attended any sober support meetings and his reason is that \"I didn't prioritize it.\" The patient will be reminded that complacency can lead to relapse and prioritizing relapse prevention is essential to his ongoing recovery.    Dimension #6 - Recovery Environment - Risk   Specific goals from treatment plan addressed this week:  1. Patient will pursue employment.  2. Patient will increase sober support options.  2. Patient " will increase participation in sober, enjoyable leisure activities  4. Patient to begin attending AA meetings and establish a home group and sponsor.  Effectiveness of strategies:  Effective: The patient owns his home with his wife and minor son. He reports it is a safe and sober environment, and that his wife is supportive of his ongoing recovery. He works full time and takes care of his child when his wife works at night. The patient The patient reports his two goals for the week are to work on EOP assignments, and stay sober. The patient is encouraged to find a weekend meeting and begin establishing a strong support network.      T) Treatment plan updated: no.  Patient notified and in agreement: NA.  Patient educated on Medical Aspects. Patient has completed 41 of 84 program hours at this time. Projected discharge date is 5/14/2018. Current discharge/transition plan is Recovery Maintenance programming.     Unique Landis [facility]  12/7/2018        Psycho-Educational Curriculum  Date Attended  Psycho-Educational Curriculum  Date Attended          Acceptance  11.27-11.29 Shame/Guilt     1st Step  11.27-11.29 Anger/Rage     Powerlessness (Surrendering) vs. Empowerment 11.27-11.29     How Acceptance Heals 11.27, 11.28     Affirmations  11.27-11.29 Mental Health     Automatic Negative Thoughts  11.28 Anxiety  11.14, 11.15   Cross Addiction  11.28 Depression 11.14, 11.15   Stages of Change  11.27, 11.28 Co-Occurring Disorders 11.15   Self-Image / Self-Esteem 11.29 Alexa/Bipolar    Use History and Consequences 11.29     Personal Responsibility 11.28, 11.29           Relapse   Trauma      Addictive Thoughts, Triggers, Craving Process  12.4-12.6 Victim Identity     Triggers/ High Risk Situations 12.5, 12.6 Stigma and MH 11.14   Relapse Process 12.4-12.6,  MH and Drug Effects 11.14, 11.15,   Warning Signs of Relapse 12.5, 12.6 MH Benefits of Humor 11.14, 11.15,   Recovering from Relapse 12.6 MH Benefits of Exercise    Relapse  "Prevention  12.4-,12.6 CBT-Wise Mind       Coping Skills  12.4-12.6,  Catastrophizing 11.15   Stinking Thinking 12.4 Mindfulness    Emergency Contact Card 12.4     PAWS 12.5 Sober Structure       Continuum of Care/Aftercare 11.19-11.20      Non-12 Step Support  11.19-11.20     Traditional support meetings 11.19, 11.20     Obtaining a Sponsor 11.20, 11.21     Goal setting 11.20, 11.21     Priorities  11.19-11.21   Relapse Prevention   What Does My Life Look Like (group worksheet)?      8--Dimensions of Wellness    Medical Aspects   Strengths Exploration 11.21   Brain/Neurotransmitters  12.13 Lifestyle Balance (w.sheet processing) 11.20   Self-Care Assessment Worksheet 12.12 Bucket List    Medication Compliance   Spirituality  11.20, 11.21   LV Alcohol/Drug Research  12.13 Weekend Planner           Physical Health  12.11, 12.13 Educational Videos     HIV/AIDS education      Post Acute Withdrawal  12.12 1st Step     Pregnancy and Drug Use   2nd Step     Sexual Health  12.13 Assertive Communication     Short-Term/Long-Term Effects  12.11, 12.13 My name is Naren LEON      [employer] vs. Spirituality    Relationships   Cross Addiction     Assertive Communication   God As We Understood Him     Boundaries   HBO Relapse     Codependence    HBO What Is Addiction     Defense Mechanisms    Medical Aspects 1     Family Roles   Medical Aspects 2     Goodbye Letter   National Geographic: Stress     Intimacy   PBS Depression Out of the Shadows     Needs/Dealbreakers in Relationships   The Anonymous People    Socialization Skills   [place]       Stop Catastrophic Thinking        11.15,    Feelings   Luis Turner \"Highjacked Brain\"    ABC Model of Emotion   Mauricio Martin Humor in Tx    Grief and Loss   The Mindfulness Movie    Healthy vs. Unhealthy Feelings   Naren LEON documentary     Meditation/Mindfulness   Thursday Motivational Video 11.15, 11.20, 11. 29, 12.6   Overconfidence/Complacency       Resentments       Stress           "

## 2021-06-22 NOTE — PROGRESS NOTES
Herminio Gloria attended 3 hours of group therapy today.    Total group size of 9    11/28/2018 9:19 PM Unique Landis

## 2021-06-22 NOTE — PROGRESS NOTES
Weekly Progress Note - late entry for 2018  Herminio Gloria  ; 1980  MRN; [id number]      D) Pt attended 2 groups this week with 1 absences. Patient attended 0 individual sessions this week. Patient is scheduled to attend EOP 3 times weekly.  A) Staff facilitated groups and reviewed tx progress. Assessed for [facility].   R) No VAP needed at this time.   Any significant events, defined as events that impact patients relationship with others inside and outside of treatment: No  Indicate any changes or monitoring of physical or mental health problems: No    Indicate involvement by any outside supports: No  IAPP reviewed and modified as needed: NA  Pt working on the following dimensions:    Dimension #1 - Withdrawal Potential - Risk   Specific goals from treatment plan addressed this week:  1. Patient to maintain abstinence throughout outpatient treatment.   2. Patient to report any substance/alcohol use to counselor to determine if any changes need to be made to address withdrawal symptoms.   3. Patient to complete any requested UAs.   Effectiveness of strategies:  Effective; The patient reports his DOC as alcohol, and reported he had a relapse on 2018. The patient reports no triggers or cravings this week and could not identify what led to his deciding to use.  No signs or symptoms of intoxication or observed while in EOP.      Dimension #2 - Biomedical - Risk   Specific goals from treatment plan addressed this week:  1. Patient to maintain stable health throughout outpatient treatment.   2. Patient to report any changes in physical health to counselor.    3.Patient to take medications as prescribed   Effectiveness of strategies:  Effective;  The Pt reports no biomedical issues. The patient reports his PCP is Dr. Herrera (not sure of spelling). Pt reports his last exam was in the spring of 2018. Patient reports he is now being prescribed Naltrexone for cravings.    Dimension #3 -  "Emotional/Behavioral/Cognitive - Risk   Specific goals from treatment plan addressed this week:  1. Patient will address untreated MH symptoms  2. Patient will manage MH symptoms effectively to avoid future use of substances  3. Patient will develop coping skills to minimize impulsive behaviors                                                                                                                             4. Patient will schedule and complete an appointment for a Diagnostic Assessment and follow recommendations for treating MH symptoms  5. Patient to learn and begin using coping skills learned in CD treatment and/or therapy, and share in daily check-ins any benefits or challenges that you experience using these skills.  Effectiveness of strategies:  Effective;The patient reports being a 4 on a scale of 1-10 regarding how confident he currently feels about being able to maintain his sobriety. The Pt reports looking forward to 2019.  The patient completed a MH DA in [facility] on 12.21.18 and now has a therapist with [facility] (Danni).  The patient reports his current relationship challenges are with his wife Ashley, because she is \"still having a hard time with my last slip.\" The patient reports his high points for the past week were Allison with his family.                                                                                                                                 Dimension #4 - Treatment Acceptance/Resistance - Risk   Specific goals from treatment plan addressed this week:  1. Patient to increase motivation towards recovery by participating in outpatient programming.   2. Patient will complete Use History assignment    3. Patient will complete Letter to his Drug of Choice and present in group for processing  Effectiveness of strategies:  Effective: Pt attended three EOP sessions this week. The patient reports  He is prepared to present his Use History assignment to his peers in group and " is looking forward to questions and feedback. Patient reports also working on his Goodbye Letter to his DOC. The patient continues to be an active participant in his group process sessions.    Dimension #5 - Relapse Potential - Risk   Specific goals from treatment plan addressed this week:  1. Patient will learn to identify triggers and early warning signs of use/relapse to gain awareness of patterns of use.  2. Patient will complete comprehensive Relapse Prevention Plan prior to program completion.     3. Patient to share in daily check-in any urges and addictive thinking to better understand his pattern of use and to prevent relapse in the future.   4. Patient will learn positive coping skills to deal with stressors as they arise.  Effectiveness of strategies:  Effective; The patient reports th coping skills that he is using to avoid using are listening to music, exercising, and working on building a sober network, however, the patient reports he did not attend any support meetings this week. Patient reportsbeing a 8 on a scale of 1-10 regarding how confident he currently feels about being able to maintain his sobriety. The Pt reports he has more tools in his belt. The patient is cautioned to be aware of the honeymoon phase that leads  To complacency. The patient reports three friend: Jesus Dyer and Sandro are sober supports for him. He reports his goals for the next week are to stay sober and plan for 2019.    Dimension #6 - Recovery Environment - Risk   Specific goals from treatment plan addressed this week:  1. Patient will pursue employment.  2. Patient will increase sober support options.  2. Patient will increase participation in sober, enjoyable leisure activities  4. Patient to begin attending AA meetings and establish a home group and sponsor.  Effectiveness of strategies:  Effective: Patient was educated on Relationships this week. Topics included, Socialization Skills, Core Values, and Healthy vs.  Unhealthy Relationships, Relationships and [place] setting, Assertive Communication, Needs / Deal Breakers, Intimacy, Codependency, and Sober Support.  The patient owns his home with his wife and minor son. He reports it is a safe and sober environment, and that his wife is supportive of his ongoing recovery. He works full time and takes care of his child when his wife works on some nights. The patient is encouraged to find a weekend meeting and begin establishing a strong support network.      T) Treatment plan updated: no.  Patient notified and in agreement: NA.  Patient educated on Relationships. Patient has completed 50 of 84 program hours at this time. Projected discharge date is 5/14/2018. Current discharge/transition plan is Recovery Maintenance programming.     [facility]  12/7/2018        Psycho-Educational Curriculum  Date Attended  Psycho-Educational Curriculum  Date Attended          Acceptance  11.27-11.29 Shame/Guilt     1st Step  11.27-11.29 Anger/Rage     Powerlessness (Surrendering) vs. Empowerment 11.27-11.29     How Acceptance Heals 11.27, 11.28     Affirmations  11.27-11.29 Mental Health     Automatic Negative Thoughts  11.28 Anxiety  11.14, 11.15   Cross Addiction  11.28 Depression 11.14, 11.15   Stages of Change  11.27, 11.28 Co-Occurring Disorders 11.15   Self-Image / Self-Esteem 11.29 Alexa/Bipolar    Use History and Consequences 11.29     Personal Responsibility 11.28, 11.29           Relapse   Trauma      Addictive Thoughts, Triggers, Craving Process  12.4-12.6 Victim Identity     Triggers/ High Risk Situations 12.5, 12.6 Stigma and MH 11.14   Relapse Process 12.4-12.6,  MH and Drug Effects 11.14, 11.15,   Warning Signs of Relapse 12.5, 12.6 MH Benefits of Humor 11.14, 11.15,   Recovering from Relapse 12.6 MH Benefits of Exercise    Relapse Prevention  12.4-,12.6 CBT-Wise Mind       Coping Skills  12.4-12.6,  Catastrophizing 11.15   Stinking Thinking 12.4 Mindfulness    Emergency  "Contact Card 12.4     PAWS 12.5 Sober Structure       Continuum of Care/Aftercare 11.19-11.20      Non-12 Step Support  11.19-11.20     Traditional support meetings 11.19, 11.20     Obtaining a Sponsor 11.20, 11.21     Goal setting 11.20, 11.21     Priorities  11.19-11.21   Relapse Prevention   What Does My Life Look Like (group worksheet)?      8--Dimensions of Wellness    Medical Aspects   Strengths Exploration 11.21   Brain/Neurotransmitters  12.13 Lifestyle Balance (w.sheet processing) 11.20   Self-Care Assessment Worksheet 12.12 Bucket List    Medication Compliance   Spirituality  11.20, 11.21   LV Alcohol/Drug Research  12.13 Weekend Planner           Physical Health  12.11, 12.13 Educational Videos     HIV/AIDS education      Post Acute Withdrawal  12.12 1st Step     Pregnancy and Drug Use   2nd Step     Sexual Health  12.13 Assertive Communication     Short-Term/Long-Term Effects  12.11, 12.13 My name is Naren LEON      Amish vs. Spirituality    Relationships   Cross Addiction     Assertive Communication   God As We Understood Him     Boundaries  12.26 HBO Relapse     Codependence   12.26 HBO What Is Addiction     Defense Mechanisms    Medical Aspects 1     Family Roles   Medical Aspects 2     Goodbye Letter   [employer] Geographic: Stress     Intimacy   PBS Depression Out of the [place]adows     Needs/Dealbreakers in Relationships  12.26 The Anonymous People    Socialization Skills  12.27 [place]     Core Values 12.27 Stop Catastrophic Thinking        11.15,         Feelings   Luis Turner \"Highjacked Brain\"    ABC Model of Emotion   Mauricio Martin Humor in [place]    Grief and Loss  12.20 The Mindfulness Movie    Healthy vs. Unhealthy Feelings  12.20 Naren LEON documentary     Meditation/Mindfulness   Thursday Motivational Video 11.15, 11.20, 11. 29, 12.6, 12.13, 12.20. 12.27   Overconfidence/Complacency  12.20     Resentments       Stress           "

## 2021-06-22 NOTE — PROGRESS NOTES
Herminio Gloria attended 3 hours of group therapy today.    Total group size of 9.    12/13/2018 9:31 PM [facility]

## 2021-06-22 NOTE — PROGRESS NOTES
Herminio Gloria attended 3 hours of group therapy today.    Total group size of 14    12/11/2018 9:21 PM [facility]

## 2021-06-22 NOTE — PROGRESS NOTES
Herminio Gloria attended 3 hours of group therapy today.    Total group size of 10.    12/26/2018 9:21 PM [facility]

## 2021-06-22 NOTE — PROGRESS NOTES
Herminio Gloria attended 3 hours of group therapy today.    Total group size of 14.    1/3/2019 9:35 PM [facility]

## 2021-06-22 NOTE — PROGRESS NOTES
Herminio Gloria attended 3 hours of group therapy today.    Total group size of 9.    12/5/2018 9:21 PM [facility]

## 2021-06-22 NOTE — PROGRESS NOTES
Herminio Gloria attended 3 hours of group therapy today.    Total group size of 10    11/29/2018 9:33 PM [facility]

## 2021-06-22 NOTE — PROGRESS NOTES
Herminio Gloria attended 3 hours of group therapy today.    Total group size of 11.    12/6/2018 9:15 PM [facility]

## 2021-06-22 NOTE — PROGRESS NOTES
Herminio Gloria attended 3 hours of group therapy today.    Total group size of 10    1/2/2019 4:23 PM [facility]

## 2021-06-22 NOTE — PROGRESS NOTES
Family Counseling Session 4:30-5:30 pm (1 hour)    Met with the patient and his wife Ashley at their request to discuss concerns regarding his challenges with remaining sober. The patient admitted to using alcohol on Saturday 12.29.18. The patient's wife appeared tearful, while expressing her concerns that the the patient cannot seem to stay sober more than two weeks at a time. The patient achnowledged his wife's concerns. Time was spent discussing the patient's challenges with admitting, though not accepting that he cannot use alcohol. Also, the patient was asked to talked about what the conversation sounded like in his head before he decided to drink, knowing that it would be self sabatoge. The patient explained that he understands the ramifications of his use, however, is having difficulty digesting the reality of how his use is negatively impacting his life and the lives of his family members. The patient appears to be struggling with issues of control, also, grief over having to let that part of his life go. Further discussion regarding the benefits of the patient being referred to a residential treatment facility in order to better focus on his addiction and MH concerns. Writer suggested they to take a day to discuss inpatient, residential or IOP treatment options between. The patient indicated that he did not want to go away for 30-days because it would be lonely, also, it would change his work, family and other lifesyle routines. The patient was encouraged to understand the importance of putting his sobriety and long term recovery above all else at this time. The patient was informed that he cannot continue to use alcohol or any substances and remain in EOP. Writer informed them that a list of treatment facilities geared toward the patient's needs will be given to patient on Wednesday when he returns to EOP.  The patient and his wife were commended for their honesty and fortitude in reaching out for  assistance. Writer will follow upon 1/2/19.

## 2021-06-22 NOTE — PROGRESS NOTES
Mental Health Visit Note    12/21/2018   Start time: 1000    Stop Time: 1100   Session # 1    Herminio Gloria is a 38 y.o. male is being seen today for    Chief Complaint   Patient presents with     Intake     Anxiety      New symptoms or complaints: Patient indicated struggling with anxiety and sobriety.     Functional Impairment:   Personal: 3  Family: 3  Work: 2  Social:2    Clinical assessment of mental status: Herminio Gloria presented on time.  He was open and cooperative, and dressed appropriately for this session and weather. His memory was good for short and long term.  His  speech and language was soft and concise.  Concentration and focus is within normal limits. Psychosis is not noted or reported. He reports his mood is anxious.  Affect is congruent with speech.  Fund of knowledge is adequate. Insight is adequate for therapy.     Suicidal/Homicidal Ideation present: None Reported This Session    Patient's impression of their current status: Patient presented for first intake session. Patient indicated he comes to therapy due to struggles with anxiety and drinking. Patient reported he believes he has used alcohol as a way to cope. Patient indicated certain events occurring that has made him decide he wants to stop drinking. Patient reported feeling it will be important for him to seek therapy as additional support while he is first in recovery. Patient indicated different struggles he has encountered due to his drinking.    Therapist impression of patients current state:  Patient is a 38 year old male. Patient was referred by ALEXANDRA Boone to engage in individual psychotherapy to address symptoms of anxiety. The patient appeared cooperative and open-minded throughout the intake and easily engaged in dialogue. Therapist and patient reviewed consent and privacy policy. Patient reported understanding and signed document- sent to be scanned into EMR. The patient has not engaged in outpatient  psychotherapy services in the community so there is no diagnostic assessment on file or available. TTherapist and patient completed C-SSRS together in session. No concerns about patient's safety or the safety of others per assessment today.  These questionnaires will be used to inform diagnoses and will be uploaded to patient chart after standard DA is completed. Therapist and patient will further review patient's history during the next follow-up encounter in order to complete a standard diagnostic assessment. Goals for treatment will be established after the 2nd or 3rd follow-up session with this provider. The patient plans to follow-up with psychotropic medication management with her PCP. Patient did not request psychiatry services at intake.     Type of psychotherapeutic technique provided: Insight oriented, Client centered and CBT    Progress toward short term goals:Progress as expected, patient coming to intake.    Review of long term goals: Not done at today's visit due to first visit.     Diagnosis:   1. Adjustment disorder with anxious mood        Plan and Follow up: Patient will return in two weeks. Patient should complete paperwork provided by therapist. Patient would benefit from continuing to identify stressors/issues in his life. Patient should continue to follow recommendations by CD counselors.       Discharge Criteria/Planning: Patient will continue with follow-up until therapy can be discontinued without return of signs and symptoms.    Danni Backer 12/21/2018

## 2021-06-22 NOTE — PROGRESS NOTES
Herminio Gloria attended 3 hours of group therapy today.    Total group size of 13    12/20/2018 9:24 PM [facility]

## 2021-06-22 NOTE — PROGRESS NOTES
1x1 Counseling Session - late entry for 11/27/2018 4:30-5:30 pm (1 hr).    Met with the patient for first individual counseling session to check in on any concerns, issues and treatment related progress. The patient reported that he is enjoying the EOP group sessions and is open to learning coping skills and techniques to arrest any further relapses. The patient also revealed that since he has been able to remain sober, he is more present for his wife and child. He described one example of being more available for his wife while she has been sick with influenza the past two weeks. The patient also reports that he is spending more quality time with his toddler child, going on outings and playing. The patient's external and internal motivation for his desire appears to be a good father and . The patient recognizes his OCD tendencies for perfectionism. Writer and  Patient discussed the importance of patient being willing to reach out and ask for help when needed. Also discussed this weeks group topic of Acceptance. The patient identified one of his main challenges is needing to feel that he has control of his life in all ways. The patient requested having a 1x1 counseling session every couple of weeks. Writer encouraged him to call and/or come in to talk to counselor whenever he needed.

## 2021-06-22 NOTE — PROGRESS NOTES
Herminio Gloria attended 3 hours of group therapy today.    Total group size of 8.    12/4/2018 9:16 PM [facility]
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
## 2021-06-22 NOTE — PROGRESS NOTES
[facility] SUBSTANCE USE DISORDER  DISCHARGE SUMMARY            Name:  Herminio Gloria   :  Sarah Appiah [facility]    Admit Date: 2018   Discharge Date: 2019   :  1980   Hours Completed: 55   Initial Diagnosis:  Alcohol Use Disorder - Moderate (F10.20), (303.90).   Final Diagnosis: Alcohol Use Disorder - Severe (F10.20), (303.90).   Discharge Address:    [address] [place] [address]   Funding Source:    [facility] Preferred     Discharge Type:  At Staff Request (ASR)    Client was receiving residential services at the time of discharge:   No      Reasons for and circumstances of service termination:  The patient was an active participant in [facility] (EOP, however, admitted to continuing to struggle with the ability to remain abstinent from alcohol. Counselor met with the patient and his wife to determine a plan of action for the best interest of the patient going forward. At that time, he admitted to he had been using alcohol during his time in EOP more often than previously reported. Patient agreed to counselor recommendation to transfer to a residential treatment program for stabilization and to assist him in remaining focused on his recovery.       If program discharge status was At Staff Request, the license mc must identify the following:    Other interested parties conferred with: Patient's wife    Referrals provided: [facility] therapy    Alternatives considered and attempted before deciding to discharge: The patient met with CD counselor for individual counseling sessions. He was also prescribed Naltrexone by his PCP.        Dimension/Course of Treatment/Individualized Care:   1.  Withdrawal Potential - Intake Risk level - 0   Discharge Risk level - 1  Narrative supporting risk description:  At intake, the patient reported his DOC as alcohol and his last day of use was 10/10/2018. He reported being unable to sustain his sobriety more than a  few months. During EOP TX the patient did admit to using alcohol 3x. The patient was recommended to a higher level of care. He was discharged from  EO on 1/4/2019.  Treatment plan goals and progress towards those goals:  1. Patient to maintain abstinence throughout outpatient treatment.   2. Patient to report any substance/alcohol use to counselor to determine if any changes need to be made to address withdrawal symptoms.   3. Patient to comply with counselor UA requests.   The patient was unable to abstain from using alcohol.     2.  Biomedical Conditions and Complications - Intake Risk level -0  Discharge Risk level - 0  Narrative supporting risk description:  At intake, the patient reported no biomedical concerns or issues. He reported having a PCP, Dr. Peña Herrera in [place]. No reported health or medical concerns during the course or at discharge from EOP treatment.   Treatment plan goals and progress towards those goals:  1. Patient to maintain stable health throughout outpatient treatment.   2. Patient to report any changes in physical health to counselor.   3. Patient to attend all scheduled doctor s appointments.   4. Patient to take medications as prescribed.   Patient complied with all guidelines.     3.  Emotional/Behavioral/Cognitive Conditions and Complications - Intake Risk level - 1  Discharge Risk level - 2  Narrative supporting risk description:  At intake, the patient described experiencing anxiousness and stress about living up to others expectations.  Pt admitted to drinking to cope with negative emotions. Patient reported a desire to continue to develop more coping skills to address MH symptoms and concerns as well as gain more insight into how to utilize these skills.  Pt is taking Celexa as prescribed by his PCP and finds it helpful. During the course of EOP, the patient obtained a  DA/intake and scheduled follow-up  therapy session in [facility], as well as, individual CD counseling  sessions. Upon discharge, the patient's therapist was notified that patient was recommended to attend a higher level of care, therefore follow up MH therapist appointments could be rescheduled.    Treatment plan goals and progress towards those goals:  1. Patient will schedule and complete an appointment for a Diagnostic Assessment and follow recommendations for treating MH symptoms.  2. Patient will manage MH symptoms effectively to avoid future use of substances.  3. Patient to learn and begin using coping skills learned in CD treatment and/or therapy , and share in daily check-ins any benefits or challenges that you experience using these skills.  4. Patient to continue taking MH meds as prescribed.  The patient was willing to share his challenges with anxiety and some episodes of depression regarding his struggles with sobriety which resulted in causing his wife stress. The patient worked on positive coping skills to address symptoms of anxiety and stress that lead to using. The patient began meeting with MH therapist at [facility] to address MH symptoms.      4.  Readiness for Change - Intake Risk level - 1  Discharge Risk level - 2  Narrative supporting risk description:  At intake,the patient reported his reason for entering EOP treatment was because he started drinking again. The Pt acknowledge verbally that his alcohol use is problematic, but stated that he would like to be able to drink socially one day.  Pt acknowledged abstinence as an expectation of treatment. During the course of his EOP TX, the patient was able to share his challenges with admittance vs. acceptance of his addiction to alcohol. The patient actively participated during group sessions and attended scheduled 1x1 counseling sessions. Patient and his wife met with counselor to discuss his third admitted alcohol relapse, at which time his being transferred to a higher level of care was considered. At discharge, the patient did agree to  "attending a higher level of care and reported he would be transferring to the [facility]'s residential program.   Treatment plan goals and progress towards those goals:  1. Patient to increase motivation towards recovery by participating in outpatient programming.   2. Patient to attend all scheduled groups,(M,W,Thurs) 6-9:00 PM, and all scheduled individual counseling sessions.  Patient actively participated in group sessions, giving and receiving appropriate feedback. He also attended scheduled individual counseling sessions. The patient is motivated for treatment, however, struggles with the concept of his addiction.       5.  Relapse/Continued Use/Continued Problem Potential - Intake Risk level - 3 Discharge Risk level - 3  Narrative supporting risk description:  At intake, the patient reported he had never engaged in treatment before. The patient appeared to lack insight to disease of addiction and personal relapse process. Patient also lacked positive coping skills. During treatment, the patient was open to learning about addiction concepts, however, continued to struggle with positive coping skills to deal with stressors, also, assist him in remaining abstinent. Patient was discharged on 1/4/2019 due to inability to remain abstinent from his DOC alcohol.     Treatment plan goals and progress towards those goals:  1. Patient will learn to identify triggers and early warning signs of use/relapse to gain awareness of patterns of use.  2. Patient to share in daily check-in any urges and addictive thinking to better understand his pattern of use and to prevent relapse in the future.   3. Patient to complete relapse prevention assignments during treatment, then complete \"Relapse Prevention Planning\" assignment and present to his/her group and/or counselor at time of EOP program completion.  Patient was unwilling or unable to to realize addiction concepts. He did not reach out to ask for help and support before " deciding to use. Patient was discharged to a higher level of care.      6.  Recovery Environment - Intake Risk level - 1  Discharge Risk level - 1  Narrative supporting risk description:  At intake, patient reported being employed FT, owns his home and lives with his wife and young son.  Pt cited no prior or current legal issues. The patient acknowledged that he lacks recovery supports. During EOP treatment, the patient reported intentions to begin attending sober support meetings, though did not follow through. Before discharge, the patient agreed that he may benefit from a higher level of care due to inability to remain sober. He called counselor to report being on his way for intake appointment at the [facility] on 1/5/2019. The patient reported an   Treatment plan goals and progress towards those goals:  1. Patient will increase sober support options.  2. Patient to investigate different forms of sober support, e.g. AA, NA CMA, WFS, HR, online support, participate in some form or support 1-2x weekly, and report reactions to counselor and/or treatment group.   Patient did not attend community support meetings while in EOP. His wife and family members are supportive of his recovery efforts. The patient reported he is taking FMLA from work to attend 30-day residential treatment. The patient was discharged from [facility] on 1/4/2019 due to inability to remain abstinent on his own. Recommendation for patient to enter a higher level of care residential program, followed by an OP and aftercare treament program.       Strengths: identified as My potential to be very caring  Needs: identified as support   Services Provided: Intake, assessment, treatment planning, education, group discussion, film, lectures, 1x1 therapy, and recommendations at discharge.        Program Involvement: Excellent  Attendance: Excellent  Ability to relate in group/   Other program  activities: Good  Assignment Completion: Fair  Overall Behavior: Good  Reported Family/Significant   Other Involvement: Fair    Prognosis: Guarded      Recommendations       Discharged to Other CD Services    Mental Health Referral  Individual Therapy      Physical Health Referral:  None. Patient has a PCP.         Counselor Name and Title: ALEXANDRA Boone        Date:  1/8/2019  Time:  12:40 pm

## 2021-06-22 NOTE — PROGRESS NOTES
ASSESSMENT:  1. Alcohol dependence with unspecified alcohol-induced disorder (H)  - naltrexone (DEPADE) 50 mg tablet; Take 1 tablet (50 mg total) by mouth daily.  Dispense: 90 tablet; Refill: 0  - Hepatic Profile    2. Anxiety disorder, unspecified type  - citalopram (CELEXA) 20 MG tablet; Take 1 tablet (20 mg total) by mouth daily.  Dispense: 90 tablet; Refill: 2      PLAN:  Discussed the use of medication for Alcohol. Discussed use of Naltrexone. Also discussed the other methods of management.He will continue with the out patient treatment for Alcohol.Also will continue Citalopram. Follow up in 3 months.    Orders Placed This Encounter   Procedures     Hepatic Profile     Medications Discontinued During This Encounter   Medication Reason     citalopram (CELEXA) 20 MG tablet Reorder       Return in about 3 months (around 3/21/2019).      CHIEF COMPLAINT:  Chief Complaint   Patient presents with     Medication Request       HISTORY OF PRESENT ILLNESS:  Herminio is a 38 y.o. male presenting to the clinic today for follow up of anxiety for which he has been on Citalopram.He also has Alcohol abuse and has been doing outpatient treatment and therapy. He notes some craving though doing well.He will like to discuss use of medications to help him if it is possible.Noted talking to his therapist and he was advised to see if he can get Naltrexone.  He does have anxiety and that is very well controlled with medication.    REVIEW OF SYSTEMS:   Full ROS was done and was negative.    PFSH:  Reviewed, as below.    Social History     Tobacco Use   Smoking Status Never Smoker   Smokeless Tobacco Never Used       Family History   Problem Relation Age of Onset     Hypertension Mother      Hypertension Brother        Social History     Socioeconomic History     Marital status: Single     Spouse name: Not on file     Number of children: Not on file     Years of education: Not on file     Highest education level: Not on file   Social  Needs     Financial resource strain: Not on file     Food insecurity - worry: Not on file     Food insecurity - inability: Not on file     Transportation needs - medical: Not on file     Transportation needs - non-medical: Not on file   Occupational History     Not on file   Tobacco Use     Smoking status: Never Smoker     Smokeless tobacco: Never Used   Substance and Sexual Activity     Alcohol use: Yes     Alcohol/week: 2.4 oz     Types: 4 Cans of beer per week     Drug use: Not on file     Sexual activity: Not on file   Other Topics Concern     Not on file   Social History Narrative     Not on file       No past surgical history on file.    No Known Allergies    Active Ambulatory Problems     Diagnosis Date Noted     Mixed hyperlipidemia 04/12/2007     Resolved Ambulatory Problems     Diagnosis Date Noted     No Resolved Ambulatory Problems     Past Medical History:   Diagnosis Date     Concussion 01/2018       Current Outpatient Medications   Medication Sig Dispense Refill     acetaminophen (TYLENOL) 500 MG tablet Take 500 mg by mouth every 6 (six) hours as needed for pain.       citalopram (CELEXA) 20 MG tablet Take 1 tablet (20 mg total) by mouth daily. 90 tablet 2     IBUPROFEN ORAL Take by mouth.       MULTIVITAMIN (MULTIPLE VITAMINS ORAL) Take by mouth.       LORazepam (ATIVAN) 0.5 MG tablet Take 1 tablet (0.5 mg total) by mouth 2 (two) times a day as needed for anxiety. 30 tablet 0     naltrexone (DEPADE) 50 mg tablet Take 1 tablet (50 mg total) by mouth daily. 90 tablet 0     No current facility-administered medications for this visit.        VITALS:  Vitals:    12/21/18 1616   BP: 130/88   Pulse: 64   Resp: 16   Weight: 183 lb (83 kg)     Wt Readings from Last 3 Encounters:   12/21/18 183 lb (83 kg)   05/04/18 180 lb 11.2 oz (82 kg)   03/30/18 185 lb 14.4 oz (84.3 kg)     Body mass index is 25.52 kg/m .    PHYSICAL EXAM:  General Appearance: Alert, cooperative, no distress, appears stated age  HEENT:  Pupils are equal and reactive, extraocular motions is normal. Neck is supple no notable thyromegaly.  External ears are normal.  Lungs: Respiration is unlabored  Heart: Normal peripheral pulsation.  Abdomen: Soft  Musculoskeletal: Normal range of motion.And normal gait.  Neurologic:  Alert and oriented times 3.   Psychiatric: Normal mood and affect.Good concentration.    MEDICATIONS:  Current Outpatient Medications   Medication Sig Dispense Refill     acetaminophen (TYLENOL) 500 MG tablet Take 500 mg by mouth every 6 (six) hours as needed for pain.       citalopram (CELEXA) 20 MG tablet Take 1 tablet (20 mg total) by mouth daily. 90 tablet 2     IBUPROFEN ORAL Take by mouth.       MULTIVITAMIN (MULTIPLE VITAMINS ORAL) Take by mouth.       LORazepam (ATIVAN) 0.5 MG tablet Take 1 tablet (0.5 mg total) by mouth 2 (two) times a day as needed for anxiety. 30 tablet 0     naltrexone (DEPADE) 50 mg tablet Take 1 tablet (50 mg total) by mouth daily. 90 tablet 0     No current facility-administered medications for this visit.

## 2021-06-29 ENCOUNTER — COMMUNICATION - HEALTHEAST (OUTPATIENT)
Dept: INTERNAL MEDICINE | Facility: CLINIC | Age: 41
End: 2021-06-29

## 2021-06-29 DIAGNOSIS — F90.2 ATTENTION DEFICIT HYPERACTIVITY DISORDER (ADHD), COMBINED TYPE: ICD-10-CM

## 2021-06-29 DIAGNOSIS — F41.9 ANXIETY DISORDER, UNSPECIFIED TYPE: ICD-10-CM

## 2021-06-29 NOTE — PROGRESS NOTES
Progress Notes by Carmina Bennett CNP at 10/26/2020  3:20 PM     Author: Carmina Bennett CNP Service: -- Author Type: Nurse Practitioner    Filed: 10/26/2020  3:57 PM Encounter Date: 10/26/2020 Status: Signed    : Carmina Bennett CNP (Nurse Practitioner)       Clinic Note    Assessment:     Assessment and Plan:  1. Encounter to establish care: Care established today. Will have him come back fasted for a physical and labs in three months.     2. Attention deficit hyperactivity disorder (ADHD), unspecified ADHD type: Referral placed today for psychiatry. Previous diagnosis of ADHD and OCD in college. He tried an old prescription of Adderall over the past week and this was helpful. Will refer him to psychiatry for testing and possible start of medication.   - Ambulatory referral to Psychiatry    3. Anxiety: Hx of this. He continues on Celexa for anxiety. Stable.     Patient Instructions     I have referred you for psychiatry eval for your ADHD.  I have placed as an urgent fashion, scheduling should call you within 24 to 48 hours to help set this appointment up.    I should see you back in about 3 months for a routine physical with fasting labs. Please let me know if anything comes up before then.    Patient Education     Attention-Deficit/Hyperactivity Disorder (ADHD) in Adults  Youve always had trouble concentrating. Your mind wanders, and its hard to finish tasks. As a result, you didnt do well in school. And now, you often struggle with your job. Sometimes this makes you simons or depressed. These may be symptoms of attention-deficit/hyperactivity disorder (ADHD). To find out more, talk to your healthcare provider. He or she can offer guidance and support.  Symptoms  of ADHD in adults  For an adult to be diagnosed with ADHD, the symptoms must have been present since childhood. The symptoms may include:    Trouble thinking things through    Low self-esteem    Depression    Trouble  holding a job    Memory problems    Problems with a marriage or relationship    Lack of discipline   What is ADHD?  Attention-deficit/hyperactivity disorder makes it hard to focus your mind. You may daydream a lot. And you may be restless much of the time. As a result, you may have trouble with detailed or boring work. And it may be hard to stick with one project for very long. You also may forget things. Or, you may miss key points during a lecture or meeting. You may even have trouble sitting through a movie or concert. At times, you may feel frustrated or angry. This can affect your relationships with others.  Who does it affect?  ADHD starts in childhood. Sometimes, your symptoms may improve as you get older. But they also may persist into your adult years. ADHD is often thought of as a kids problem. Thats why its often missed in adults. In fact, many parents learn they have ADHD when their children are diagnosed.  What causes it?  The exact cause of ADHD isnt known. The disorder does run in families. Having one parent with ADHD makes it more likely youll have it too. And the part of your brain that controls attention may be involved. Certain brain chemicals that are out of balance may also play a role.  What can be done?  The first step is finding out if you really have ADHD. Your doctor will use special guidelines to diagnose the disorder. Most adults with ADHD are greatly helped by therapy and coaching. In some cases, your doctor may also prescribe medicine to ease your symptoms.  Resources    National Resource Center on AD/HDwww.uuad3pygk.org    Attention Deficit Disorder Associationwww.add.org   Date Last Reviewed: 1/1/2017 2000-2019 [employer]. [address] [place] [address], [place] [address]. All rights reserved. This information is not intended as a substitute for professional medical care. Always follow your healthcare professional's instructions.             Return in about 3 months (around  1/26/2021) for Routine preventive, fasted labs.         Subjective:      Herminio Gloria is a 40 y.o. male presents today to [facility] care.    He reports a longstanding history of troubles with concentration.  He reports when he was in college he saw a psychologist who diagnosed him with ADHD and some slight OCD.  He reports not wanting to take medication for this at the time, so he is just been dealing with it.    He reports previously working as a  for the past 15 years, had issues with alcoholism during this time, but was able to function okay.    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    He reports that he would like to start on Adderall if he could.  He reports that a friend psychiatrist gave him Adderall 20 mg a weeks worth approximately 7 years ago.  He reports trialing this at home this week and it was very helpful for him.    He reports also being on citalopram 20 mg nightly for a history of anxiety.    He reports that he is currently  has 2 children, 5-month-old and a 4-year-old, both are alive and well.    He reports currently working out 3-5 times per week, combination of strength training and running and biking.    He does report a previous history of marijuana use back in college.  He denies any tobacco use.    He reports previous basal cell removal but no other surgeries.    His mom is 70 years old, alive, has a history of hyperlipidemia and hypertension.  His father is 69 years old, also has a history of hyperlipidemia and hypertension.  He reports both of his parents are very active and exercise frequently.    He is not fasting today.    He denies fever, chills, cough, shortness of breath, chest pain, chest pressure, nausea, vomiting, abdominal pain, urinary, or bowel symptoms today.    The following portions of the  "patient's history were reviewed and updated as appropriate.    Review of Systems:    Review is otherwise negative except for what is mentioned above.     Social Hx:    Social History     Tobacco Use   Smoking Status Never Smoker   Smokeless Tobacco Never Used         Objective:     Vitals:    10/26/20 1505   BP: 116/76   Patient Site: Right Arm   Patient Position: Sitting   Cuff Size: Adult Large   Pulse: 78   SpO2: 98%   Weight: 177 lb 4.8 oz (80.4 kg)   Height: 5' 11\" (1.803 m)       Exam:  General: No apparent distress. Calm. Alert and Oriented X3. Pt behavior is appropriate.  Head:Atraumatic. Normocephalic, non-tender to palpation.  Neck: Supple. No JVD. Full ROM. No adenopathy.  Eyes: PERRLA, No discharge. No strabismus. No nystagmus.  Ears: TMs pearly gray with landmarks visible.   Nose/Mouth/Throat: Patent nares, no oral lesions, pharynx clear and without exudate. Uvula mid-line. Nasal septum mid-line. Clear turbinates.   Lungs: Normal chest wall, clear to auscultation, normal respiratory effort and rate.   Heart: Regular rate and rhythm, no murmurs, gallops, or rubs. Capillary refill <2 seconds. No edema.   Musculoskeletal: Walks without difficulty.   Neurologic: Interactive, alert, no focal findings.  Skin: Warm, dry.Normal skin turgor.       Patient Active Problem List   Diagnosis   ? Mixed hyperlipidemia     Current Outpatient Medications   Medication Sig Dispense Refill   ? acetaminophen (TYLENOL) 500 MG tablet Take 500 mg by mouth every 6 (six) hours as needed for pain.     ? citalopram (CELEXA) 20 MG tablet Take 1 tablet (20 mg total) by mouth daily. 90 tablet 1   ? IBUPROFEN ORAL Take by mouth.       No current facility-administered medications for this visit.      Carmina Bennett, Adult-Geriatric Nurse Practitioner  [facility] - Internal Medicine Team     10/26/2020              "

## 2021-07-04 NOTE — PROGRESS NOTES
Rule 31 by Sarah Appiah LADC at 2018  1:30 PM     Author: Sarah Appiah LADC Service: Chemical Dependency Author Type: Licensed Alcohol and Drug Counselor    Filed: 2018 11:57 AM Encounter Date: 2018 Status: Signed    : Sarah Appiah LADC (Licensed Alcohol and Drug Counselor)         [facility] Assessment   Date: 11/15/2018        : ALEXANDRA Simons    Name: Herminio Gloria  Address: [address] [place] [address]  Phone: [phone] (home)   Referral Source: Self  : 1980  Age: 38 y.o.  Race/Ethnicity: White or   Marital Status:   Employment: Employed FT                                                                                                                       Level of Education: BA    Socio-economic (yearly Income) Status: middle  Sexual Orientation: hetro   Last 4 digits of Social Security:     Is assistance required in the ability to read and understand written material?   no    Reason for seeking services:    Was recommended to do outpatient back in April but chose not to. Started social drinking in . Became stressed and started drinking to cope. Began hiding it from his wife. Bought a bottle of vodka and drank the whole thing a few weeks ago.    Dimension I Acute intoxication/Withdrawal Potential:    Symptomology (past 12 months, check all that apply)  binges, weekly intoxication, secretive use, preoccupation, using alone, repeated family or social problems, mood swings, loss of control and family history of addiction    Observed or reported (withdrawal symptoms, check all that apply)  none reported or displayed    Chemical use most recent 12 months outside a facility and other significant use history (client self-report)  Primary Drug Used  Age of First Use  Most Recent Pattern of Use and Duration    Date of last use  Time if substance use in the last 30 days Withdrawal Potential? Requiring special care  Method of use   (oral,  smoked, snort, IV, etc)    Alcohol  21 Prior daily drinking - tried to control by limiting to social drinking 20 afternoon None noted oral   Marijuana/Hashish          Cocaine/Crack          Meth/Amphetamines          Heroin          Other Opiates/Synthetics          Inhalants          Benzodiazepines          Hallucinogens          Barbiturates/Sedatives/Hypnotics          Over-the-Counter Drugs          Other          Nicotine              Dimension I Risk Ratin  Reason Risk Rating Assigned: Ct cites no signs or symptoms of withdrawal and is able to manage any discomfort.   SYLVIA reported as 20        Dimension II Biomedical Conditions:    Any known health conditions: No    Ever previously treated/diagnosed with any eating disorder?  no     List Health Concerns/Conditions Reported: none noted    Does patient indicate awareness of any association between substance use and listed health concerns/conditions? No    Physical/Health Conditions which are associated with substance use: na    Are Health Concerns/Conditions being treated? Yes  By Whom? Peña Herrera MD      Patient Self-Reported Medications:  Medication Dosage Frequency   Celexa     Allegra     Multi Vit                                                    Are you pregnant: NA OB care received:NA CPS call needed: No    Dimension II Risk Ratin  Reason Risk Rating Assigned: Ct reports being in good health and is able to access all medical services as needed.  No barriers to treatment participation noted at this time.          Dimension III Emotional/Behavioral/Cognitive:    Oriented to person, place, time, situation?  Yes     Current Mental Health Services: no    Past Hospitalization for MH or psychiatric problems: No    How many Hospitalizations: 0   Last Hospitalization; date and location: none      Past or Current Issues with Gambling (Explain): no    Prior Treatment for Gambling: No     MH Diagnoses:    ADHD  Self report of  anxiety  Psychiatrist: none     Clinic: none      Current Psychotropic Medications:  Celexa  rx by PCP    Taking medications as prescribed:  Yes   Medications Helpful: Yes    Current Suicidal Ideation: No  If yes, any plan? NA What is plan?:   na    Previous Suicide Attempts?  No   Explain: na     Current Homicidal Ideation: No  If yes, any plan? NA  What is plan?: na    Previous Homicide Attempts? No Explain: na    Suicidal/Homicidal Ideation in last 30 days? No  Explain: na     Hazardous behavior engaged in which placed self or others in danger (i.e., operating a motor vehicle, unsafe sex, sharing needles, etc.)?   Driving      Family history of substance and/or mental health diagnosis/issues?  No  Explain: Family members drink, but are supportive     History of abuse (Physical, Emotional, Sexual)? No  Explain: na       Dimension III Risk Ratin  Reason Risk Rating Assigned: Ct reports feeling anxious and stress about living up to others expectations.  He admits to drinking to cope with negative emotions.  Ct is taking Celexa as prescribed by his PCP and finds it helpful.  Ct acknowledges that psychotherapy may be helpful.          Dimension IV Readiness to Change:    Mandated, or coerced into assessment or treatment:  No    Does client feel there is a problem:   Yes    Verbalization of need/desire to change:   Yes     Willing to follow treatment recommendations: Yes     Impression of : (Check all that apply):    cooperative and genuinely motivated    Are there any spiritual, cultural, or other special needs to be addressed for client to be successful in treatment? no        Dimension IV Risk Ratin  Reason Risk Rating Assigned: Ct acknowledges that his alcohol use is problematic, but states that he would like to be able to drink socially one day.  Ct acknowledged abstinence as an expectation of treatment.        Dimension V Relapse/Continued Use/Continued Problem Potential     Client age at First  Treatment: na    Lifetime # of CD Treatments:  0  List program, dates, and status of completion (within last five years): this will be his first attempt    Longest Period of Abstinence: 2 months  How did you accomplish this? I just put my mind to it     Circumstances which led to Relapse: My wife had left the house, I was stressed about finding what might be skin cancer and I had a drink or two    Risk Taking/Problem Behaviors Related to Use and/or Under the Influence: driving        Dimension V Risk Rating: 3  Reason Risk Rating Assigned:  Patient lacks insight to disease of addiction and personal relapse process. Patient lacks positive coping skills.  Ct has never engaged in treatment before.        Dimension VI Recovery Environment   Family support:  Yes  Peer Sober Support:  No    Current living circumstances:  Lives with wife and son - they own their own home    Environment supportive of recovery:  Yes    Specific activities participating in which do not involve substance use:  Working, being with my son, biking, exercise    Specific activities participating in which do involve substance use:  Some social drinking, but that is never in excess.  I drink when I'm alone.    People, things that threaten recovery: no    Expected family involvement during treatment services:  Maybe my wife    Current Legal Involvement:  none    Legal Consequences related to use: never any legals    Occupational/Academic consequences related to use: none    Current ability to function in a work and/or education setting: yes    Current support network for recovery (including community-based recovery support): none    Do you belong to a Morongo: No Which [place]? na  Reside on reservation: No     Dimension VI Risk Ratin Reason Risk Rating Assigned: Ct is employed FT, owns his home and lives with his wife and young son.  Ct cites no prior or current legal issues.  Ct acknowledges that he lacks recovery supports.          DSM-V Criteria  for Substance Abuse  Instructions:  Determine whether the client currently meets the criteria for a Substance Use Disorder using the diagnostic criteria in the  DSM-V, pp. 481-583. Current means during the most recent 12 months outside a facility that controls access to substances.    Category of substance Severity ICD-10 Code/DSM V Code  Alcohol Use Disorder Mild  Moderate  Severe (F10.10) (305.00)  (F10.20) (303.90)  (F10.20) (303.90)   Cannabis Use Disorder Mild  Moderate  Severe (F12.10) (305.20)  (F12.20) (304.30)  (F12.20) (304.30)   Hallucinogen Use Disorder Mild  Moderate  Severe (F16.10) (305.30)  (F16.20) (304.50)  (F16.20) (304.50)   Inhalant Use Disorder Mild  Moderate  Severe (F18.10) (305.90)  (F18.20) (304.60)  (F18.20) (304.60)   Opioid Use Disorder Mild  Moderate  Severe (F11.10) (305.50)  (F11.20) (304.00)  (F11.20) (304.00)   Sedative, Hypnotic, or Anxiolytic Use Disorder Mild  Moderate  Severe (F13.10) (305.40)  (F13.20) (304.10)  (F13.20) (304.10)   Stimulant Related Disorders Mild              Moderate              Severe   (F15.10) (305.70) Amphetamine type substance  (F14.10) (305.60) Cocaine  (F15.10) (305.70) Other or unspecified stimulant    (F15.20) (304.40) Amphetamine type substance  (F14.20) (304.20) Cocaine  (F15.20) (304.40) Other or unspecified stimulant    (F15.20) (304.40) Amphetamine type substance  (F14.20) (304.20) Cocaine  (F15.20) (304.40) Other or unspecified stimulant   DisorderTobacco use Disorder Mild  Moderate  Severe (Z72.0) (305.1)  (F17.200) (305.1)  (F17.200) (305.1)   Other (or unknown) Substance Use Disorder Mild  Moderate  Severe (F19.10) (305.90)  (F19.20) (304.90)  (F19.20) (304.90)     Diagnostic Impression: Alcohol Use Disorder - Moderate (F10.20) (303.90)    Assessment Completed Within 3 Sessions of Admission: Yes  If NO, date assessment to be completed noted in Treatment Plan: No      Signature of Counselor: ALEXANDRA Simons  Date and Time of Signature:  11/15/2018  3:14 PM

## 2021-07-04 NOTE — TELEPHONE ENCOUNTER
Telephone Encounter by Natalia Zurita LPN at 6/29/2021  2:54 PM     Author: Natalia Zurita LPN Service: -- Author Type: Licensed Nurse    Filed: 6/29/2021  2:56 PM Encounter Date: 6/29/2021 Status: Signed    : Natalia Zurita LPN (Licensed Nurse)       Last med check 2/10/21  Controlled substance agreement signed on 3/10/21

## 2021-07-10 ENCOUNTER — HEALTH MAINTENANCE LETTER (OUTPATIENT)
Age: 41
End: 2021-07-10

## 2021-08-04 ENCOUNTER — MYC MEDICAL ADVICE (OUTPATIENT)
Dept: INTERNAL MEDICINE | Facility: CLINIC | Age: 41
End: 2021-08-04

## 2021-08-04 DIAGNOSIS — F90.2 ATTENTION DEFICIT HYPERACTIVITY DISORDER (ADHD), COMBINED TYPE: Primary | ICD-10-CM

## 2021-08-04 RX ORDER — DEXTROAMPHETAMINE SACCHARATE, AMPHETAMINE ASPARTATE MONOHYDRATE, DEXTROAMPHETAMINE SULFATE AND AMPHETAMINE SULFATE 5; 5; 5; 5 MG/1; MG/1; MG/1; MG/1
20 CAPSULE, EXTENDED RELEASE ORAL DAILY
Qty: 30 CAPSULE | Refills: 0 | Status: SHIPPED | OUTPATIENT
Start: 2021-08-04 | End: 2021-09-01

## 2021-08-04 RX ORDER — DEXTROAMPHETAMINE SACCHARATE, AMPHETAMINE ASPARTATE MONOHYDRATE, DEXTROAMPHETAMINE SULFATE AND AMPHETAMINE SULFATE 5; 5; 5; 5 MG/1; MG/1; MG/1; MG/1
20 CAPSULE, EXTENDED RELEASE ORAL
COMMUNITY
Start: 2021-06-03 | End: 2021-08-04

## 2021-08-05 NOTE — TELEPHONE ENCOUNTER
Patient was informed that this Rx was sent yesterday to the [facility] on [address] in [place].

## 2021-08-05 NOTE — TELEPHONE ENCOUNTER
Patient calling back to check status of medication refill.  He indicates he is out of this medication and needs asap.

## 2021-09-04 ENCOUNTER — HEALTH MAINTENANCE LETTER (OUTPATIENT)
Age: 41
End: 2021-09-04

## 2021-10-03 ENCOUNTER — MYC REFILL (OUTPATIENT)
Dept: INTERNAL MEDICINE | Facility: CLINIC | Age: 41
End: 2021-10-03

## 2021-10-03 DIAGNOSIS — F90.2 ATTENTION DEFICIT HYPERACTIVITY DISORDER (ADHD), COMBINED TYPE: ICD-10-CM

## 2021-10-03 RX ORDER — DEXTROAMPHETAMINE SACCHARATE, AMPHETAMINE ASPARTATE MONOHYDRATE, DEXTROAMPHETAMINE SULFATE AND AMPHETAMINE SULFATE 5; 5; 5; 5 MG/1; MG/1; MG/1; MG/1
20 CAPSULE, EXTENDED RELEASE ORAL DAILY
Qty: 30 CAPSULE | Refills: 0 | Status: CANCELLED | OUTPATIENT
Start: 2021-10-03

## 2021-10-03 NOTE — TELEPHONE ENCOUNTER
Routing refill request to provider for review/approval because:  Drug not on the [facility] refill protocol     Last Written Prescription Date:  6/29/21  Last Fill Quantity: 30,  # refills: 0   Last office visit provider:  2/10/21     Requested Prescriptions   Pending Prescriptions Disp Refills     amphetamine-dextroamphetamine (ADDERALL XR) 20 MG 24 hr capsule 30 capsule 0     Sig: Take 1 capsule (20 mg) by mouth daily       There is no refill protocol information for this order          Kaylee Biggs RN 10/03/21 3:46 PM

## 2021-10-04 ENCOUNTER — MYC MEDICAL ADVICE (OUTPATIENT)
Dept: INTERNAL MEDICINE | Facility: CLINIC | Age: 41
End: 2021-10-04

## 2021-10-04 DIAGNOSIS — F90.2 ATTENTION DEFICIT HYPERACTIVITY DISORDER (ADHD), COMBINED TYPE: ICD-10-CM

## 2021-10-04 RX ORDER — DEXTROAMPHETAMINE SACCHARATE, AMPHETAMINE ASPARTATE MONOHYDRATE, DEXTROAMPHETAMINE SULFATE AND AMPHETAMINE SULFATE 5; 5; 5; 5 MG/1; MG/1; MG/1; MG/1
20 CAPSULE, EXTENDED RELEASE ORAL DAILY
Qty: 30 CAPSULE | Refills: 0 | Status: SHIPPED | OUTPATIENT
Start: 2021-10-04 | End: 2021-11-01

## 2021-10-05 NOTE — TELEPHONE ENCOUNTER
This refill was authorized by PCP yesterday.    Last med check 2/10/21  Controlled substance agreement signed on 3/10/21

## 2021-11-01 ENCOUNTER — MYC REFILL (OUTPATIENT)
Dept: INTERNAL MEDICINE | Facility: CLINIC | Age: 41
End: 2021-11-01

## 2021-11-01 DIAGNOSIS — F90.2 ATTENTION DEFICIT HYPERACTIVITY DISORDER (ADHD), COMBINED TYPE: ICD-10-CM

## 2021-11-02 ENCOUNTER — MYC REFILL (OUTPATIENT)
Dept: INTERNAL MEDICINE | Facility: CLINIC | Age: 41
End: 2021-11-02

## 2021-11-02 DIAGNOSIS — F90.2 ATTENTION DEFICIT HYPERACTIVITY DISORDER (ADHD), COMBINED TYPE: ICD-10-CM

## 2021-11-02 NOTE — TELEPHONE ENCOUNTER
Routing refill request to provider for review/approval because:  Controlled substance request    Last Written Prescription Date:  10/4/21  Last Fill Quantity: 30,  # refills: 0   Last office visit provider:  2/10/21     Requested Prescriptions   Pending Prescriptions Disp Refills     amphetamine-dextroamphetamine (ADDERALL XR) 20 MG 24 hr capsule 30 capsule 0     Sig: Take 1 capsule (20 mg) by mouth daily       There is no refill protocol information for this order          Mauricio Gallardo RN 11/02/21 9:58 AM

## 2021-11-03 ENCOUNTER — VIRTUAL VISIT (OUTPATIENT)
Dept: INTERNAL MEDICINE | Facility: CLINIC | Age: 41
End: 2021-11-03

## 2021-11-03 DIAGNOSIS — F90.2 ATTENTION DEFICIT HYPERACTIVITY DISORDER (ADHD), COMBINED TYPE: Primary | ICD-10-CM

## 2021-11-03 DIAGNOSIS — F41.9 ANXIETY DISORDER, UNSPECIFIED TYPE: ICD-10-CM

## 2021-11-03 PROCEDURE — 99207 PR NO CHARGE LOS: CPT | Mod: 25 | Performed by: NURSE PRACTITIONER

## 2021-11-03 RX ORDER — DEXTROAMPHETAMINE SACCHARATE, AMPHETAMINE ASPARTATE MONOHYDRATE, DEXTROAMPHETAMINE SULFATE AND AMPHETAMINE SULFATE 5; 5; 5; 5 MG/1; MG/1; MG/1; MG/1
20 CAPSULE, EXTENDED RELEASE ORAL DAILY
Qty: 30 CAPSULE | Refills: 0 | Status: SHIPPED | OUTPATIENT
Start: 2021-11-03 | End: 2021-12-01

## 2021-11-03 RX ORDER — CITALOPRAM HYDROBROMIDE 20 MG/1
20 TABLET ORAL DAILY
Qty: 90 TABLET | Refills: 0 | Status: SHIPPED | OUTPATIENT
Start: 2021-11-03 | End: 2021-12-06

## 2021-11-03 RX ORDER — DEXTROAMPHETAMINE SACCHARATE, AMPHETAMINE ASPARTATE MONOHYDRATE, DEXTROAMPHETAMINE SULFATE AND AMPHETAMINE SULFATE 5; 5; 5; 5 MG/1; MG/1; MG/1; MG/1
20 CAPSULE, EXTENDED RELEASE ORAL DAILY
Qty: 30 CAPSULE | Refills: 0 | Status: SHIPPED | OUTPATIENT
Start: 2021-12-04 | End: 2022-01-03

## 2021-11-03 RX ORDER — DEXTROAMPHETAMINE SACCHARATE, AMPHETAMINE ASPARTATE MONOHYDRATE, DEXTROAMPHETAMINE SULFATE AND AMPHETAMINE SULFATE 5; 5; 5; 5 MG/1; MG/1; MG/1; MG/1
20 CAPSULE, EXTENDED RELEASE ORAL DAILY
Qty: 30 CAPSULE | Refills: 0 | Status: SHIPPED | OUTPATIENT
Start: 2022-01-04 | End: 2022-02-03

## 2021-11-03 RX ORDER — DEXTROAMPHETAMINE SACCHARATE, AMPHETAMINE ASPARTATE MONOHYDRATE, DEXTROAMPHETAMINE SULFATE AND AMPHETAMINE SULFATE 5; 5; 5; 5 MG/1; MG/1; MG/1; MG/1
20 CAPSULE, EXTENDED RELEASE ORAL DAILY
Qty: 30 CAPSULE | Refills: 0 | Status: SHIPPED | OUTPATIENT
Start: 2021-11-03 | End: 2021-12-03

## 2021-11-03 NOTE — PROGRESS NOTES
The patient reports that he is doing well on his current medications, does not need the dose changed at this time. Video visit is not needed today, will refill medications. Patient was not seen today.

## 2021-11-04 RX ORDER — DEXTROAMPHETAMINE SACCHARATE, AMPHETAMINE ASPARTATE MONOHYDRATE, DEXTROAMPHETAMINE SULFATE AND AMPHETAMINE SULFATE 5; 5; 5; 5 MG/1; MG/1; MG/1; MG/1
20 CAPSULE, EXTENDED RELEASE ORAL DAILY
Qty: 30 CAPSULE | Refills: 0 | OUTPATIENT
Start: 2021-11-04

## 2021-12-01 ENCOUNTER — MYC REFILL (OUTPATIENT)
Dept: INTERNAL MEDICINE | Facility: CLINIC | Age: 41
End: 2021-12-01

## 2021-12-01 DIAGNOSIS — F90.2 ATTENTION DEFICIT HYPERACTIVITY DISORDER (ADHD), COMBINED TYPE: ICD-10-CM

## 2021-12-01 RX ORDER — DEXTROAMPHETAMINE SACCHARATE, AMPHETAMINE ASPARTATE MONOHYDRATE, DEXTROAMPHETAMINE SULFATE AND AMPHETAMINE SULFATE 5; 5; 5; 5 MG/1; MG/1; MG/1; MG/1
20 CAPSULE, EXTENDED RELEASE ORAL DAILY
Qty: 30 CAPSULE | Refills: 0 | Status: CANCELLED | OUTPATIENT
Start: 2021-12-01

## 2021-12-01 NOTE — TELEPHONE ENCOUNTER
Refill Request  Medication name: Pending Prescriptions:                       Disp   Refills    amphetamine-dextroamphetamine (ADDERALL X*30 cap*0            Sig: Take 1 capsule (20 mg) by mouth daily    Who prescribed the medication: Donald  Last refill on medication: 11/03/21  Requested Pharmacy: [facility]  Last appointment with PCP: 11/03/21  Next appointment: Not due

## 2021-12-03 DIAGNOSIS — F90.2 ATTENTION DEFICIT HYPERACTIVITY DISORDER (ADHD), COMBINED TYPE: ICD-10-CM

## 2021-12-03 RX ORDER — DEXTROAMPHETAMINE SACCHARATE, AMPHETAMINE ASPARTATE MONOHYDRATE, DEXTROAMPHETAMINE SULFATE AND AMPHETAMINE SULFATE 5; 5; 5; 5 MG/1; MG/1; MG/1; MG/1
20 CAPSULE, EXTENDED RELEASE ORAL DAILY
Qty: 30 CAPSULE | Refills: 0 | Status: SHIPPED | OUTPATIENT
Start: 2021-12-03 | End: 2022-01-02

## 2021-12-03 RX ORDER — DEXTROAMPHETAMINE SACCHARATE, AMPHETAMINE ASPARTATE MONOHYDRATE, DEXTROAMPHETAMINE SULFATE AND AMPHETAMINE SULFATE 5; 5; 5; 5 MG/1; MG/1; MG/1; MG/1
20 CAPSULE, EXTENDED RELEASE ORAL DAILY
Qty: 30 CAPSULE | Refills: 0 | Status: SHIPPED | OUTPATIENT
Start: 2022-01-01 | End: 2022-07-27

## 2021-12-03 NOTE — TELEPHONE ENCOUNTER
Routing refill request to provider for review/approval because:  Drug not on the [facility] refill protocol - controlled medication    Last Written Prescription Date:  11/3/2021  Last Fill Quantity: 30,  # refills: 0   Last office visit provider:  11/3/2021     Requested Prescriptions   Pending Prescriptions Disp Refills     amphetamine-dextroamphetamine (ADDERALL XR) 20 MG 24 hr capsule 30 capsule 0     Sig: Take 1 capsule (20 mg) by mouth daily       There is no refill protocol information for this order          Madiha Dias RN 12/02/21 11:11 PM

## 2021-12-03 NOTE — TELEPHONE ENCOUNTER
Fax received from pharmacy for new Adderall XR that needs to be postdated to 1/1/22, it was accidentally deleted. Rx pended.

## 2021-12-04 DIAGNOSIS — F41.9 ANXIETY DISORDER, UNSPECIFIED TYPE: ICD-10-CM

## 2021-12-06 RX ORDER — CITALOPRAM HYDROBROMIDE 20 MG/1
TABLET ORAL
Qty: 30 TABLET | Refills: 2 | Status: SHIPPED | OUTPATIENT
Start: 2021-12-06 | End: 2022-07-27

## 2021-12-06 NOTE — TELEPHONE ENCOUNTER
"Last Written Prescription Date:  11/3/21  Last Fill Quantity: 90,  # refills: 0   Last office visit provider:  11/3/21    Routing refill request to provider for review/approval because:  Early refill requested.      Requested Prescriptions   Pending Prescriptions Disp Refills     citalopram (CELEXA) 20 MG tablet [Pharmacy Med Name: CITALOPRAM HBR 20 MG TABLET] 30 tablet 2     Sig: TAKE 1 TABLET BY MOUTH EVERY DAY       [facility] Protocol Passed - 12/4/2021 10:03 AM        Passed - Recent (12 mo) or future (30 days) visit within the authorizing provider's specialty     Patient has had an office visit with the authorizing provider or a provider within the authorizing providers department within the previous 12 mos or has a future within next 30 days. See \"Patient Info\" tab in inbasket, or \"Choose Columns\" in Meds & Orders section of the refill encounter.              Passed - Medication is active on med list        Passed - Patient is age 18 or older             Karol Rogers RN 12/06/21 3:10 PM  "

## 2022-01-02 ENCOUNTER — MYC REFILL (OUTPATIENT)
Dept: INTERNAL MEDICINE | Facility: CLINIC | Age: 42
End: 2022-01-02

## 2022-01-02 DIAGNOSIS — F90.2 ATTENTION DEFICIT HYPERACTIVITY DISORDER (ADHD), COMBINED TYPE: ICD-10-CM

## 2022-01-04 NOTE — TELEPHONE ENCOUNTER
Routing refill request to provider for review/approval because:  Controlled substance request    Last Written Prescription Date:  12/3/21  Last Fill Quantity: 30,  # refills: 0   Last office visit provider:  11/3/21     Requested Prescriptions   Pending Prescriptions Disp Refills     amphetamine-dextroamphetamine (ADDERALL XR) 20 MG 24 hr capsule 30 capsule 0     Sig: Take 1 capsule (20 mg) by mouth daily       There is no refill protocol information for this order          Mauricio Gallardo RN 01/04/22 3:24 PM

## 2022-01-05 ENCOUNTER — LAB (OUTPATIENT)
Dept: LAB | Facility: CLINIC | Age: 42
End: 2022-01-05

## 2022-01-05 DIAGNOSIS — J02.9 ACUTE PHARYNGITIS, UNSPECIFIED ETIOLOGY: Primary | ICD-10-CM

## 2022-01-05 DIAGNOSIS — J02.9 ACUTE PHARYNGITIS, UNSPECIFIED ETIOLOGY: ICD-10-CM

## 2022-01-05 PROCEDURE — 87651 STREP A DNA AMP PROBE: CPT

## 2022-01-05 PROCEDURE — U0003 INFECTIOUS AGENT DETECTION BY NUCLEIC ACID (DNA OR RNA); SEVERE ACUTE RESPIRATORY SYNDROME CORONAVIRUS 2 (SARS-COV-2) (CORONAVIRUS DISEASE [COVID-19]), AMPLIFIED PROBE TECHNIQUE, MAKING USE OF HIGH THROUGHPUT TECHNOLOGIES AS DESCRIBED BY CMS-2020-01-R: HCPCS

## 2022-01-05 PROCEDURE — U0005 INFEC AGEN DETEC AMPLI PROBE: HCPCS

## 2022-01-05 RX ORDER — DEXTROAMPHETAMINE SACCHARATE, AMPHETAMINE ASPARTATE MONOHYDRATE, DEXTROAMPHETAMINE SULFATE AND AMPHETAMINE SULFATE 5; 5; 5; 5 MG/1; MG/1; MG/1; MG/1
20 CAPSULE, EXTENDED RELEASE ORAL DAILY
Qty: 30 CAPSULE | Refills: 0 | Status: SHIPPED | OUTPATIENT
Start: 2022-01-05 | End: 2022-01-31

## 2022-01-06 LAB
DEPRECATED S PYO AG THROAT QL EIA: NEGATIVE
GROUP A STREP BY PCR: NOT DETECTED
SARS-COV-2 RNA RESP QL NAA+PROBE: NEGATIVE

## 2022-01-13 NOTE — PROGRESS NOTES
COMPREHENSIVE ASSESSMENT    Background Information   Original Date of Assessment:  4/11/2018 Referral Source:  His wife and healthcare provider   Evaluation Counselor:  ALEXANDRA Jacob Counselor Telephone #:   [phone] Assessment Site:  [facility]   Patient Name:   Herminio Gloria YOB: 1980 Age:  37 year old Gender:  male Medical Record #:  [id number]   Patient's Primary Language:  English Do you need assistance with reading, writing or hearing?  Do you need a ?  No   Current Address:  [address] [place] [address]   Patient Phone Number:  [phone] (home)    Patient Mobile Number:    Telephone Information:   Mobile [phone]      Patient E-mail Address:  NA     Which pronouns do you prefer to be referred by?  He/Him     With which race do you identify?  White     This patient was seen for a face to face assessment on 4/11/2018:  Yes       Crisis Intervention Questions     1. Are you currently having severe withdrawal symptoms that are putting yourself or others in danger?  No    2. Are you currently having severe medical problems that require immediate attention?  No    3. Are you currently having severe emotional or behavioral problems that are putting yourself or others at risk of harm?  No    Precipitating Event Summary     What are the circumstances or events that have led up to you participating in this evaluation today?    The patient came to [facility] for evaluation of possible chemical dependency.  The reason for the substance abuse assessment was due to pressure from his wife for him to get help and due to encouragement from his healthcare providers for him to have a substance abuse assessment.  The precipitating event was the patient had consumed several glasses of wine as well as some vodka on 3/28/2018.  The patient's wife become concerned when the patient started slurring his words because he had  suffered a concussion around 2 weeks prior and she felt his symptoms of impairment might be related to some ongoing effects from his concussion.  The patient's wife brought him to an Emergency Department to be evaluated for his symptoms of impairment and while he was at the Emergency Department he was measured to have a (0.26) EMELIA so it was determined his symptoms of impairment were likely related to his intoxication.  The patient reported he had been drinking alcohol on a daily basis for the past few years and he had recently started to hide his use of alcohol form his wife on weekdays because she had no longer wanted him to drink alcohol on weeknights.  The patient reported his use of alcohol had escalated over this past year in part due to stressors related to having a 19-month old son who didn't sleep for the first 11 months and due to his wife suffering from post-partum depression over the past year.  The patient reported on weeknights he would typically go down to the basement and sneak a few drinks in an attempt to alleviate his stress and anxiety.  The patient reported having a history of mental health issues, including being diagnosed with ADHD while in college, a current diagnosis of Anxiety D/O NOS and having symptoms of OCD including a tendency to double and triple checking doors.  The patient stated that he was not interested in permanently stopping his use of alcohol, but rather wanted to stop for a period of time and then eventually try to resume drinking alcohol in a minimal and social manner.    Have you participated in prior substance use disorder evaluations?     Yes. When, Where, and What circumstances: 2015 - DWI class and MADD panel    Comprehensive Substance Use History    X = Primary Drug Used Age of First Use    Pattern of Substance Use   Make sure to include period of heaviest use in life and a use history within the past year if applicable.  Please include a pattern with a specific range  of amounts used and a frequency of use:  (DSM-5: Sx #3) Date of last use  Quantity of last use if within the past 30 days Withdrawal Potential?  Screen for need of IP detox or other medical intervention Method of use  (Oral, smoked, snorted, IV, etc)     X Alcohol       21 For a year period of time prior this past year, he reported drinking 1-2 glasses of wine with dinner on a nightly basis and 3-6 beers or glasses of wine.    HU: Past year until 3/28/2018, when he reported a pattern of drinking alcohol on a daily basis.  He reported consuming between 2-4 beers or mixed drinks on week nights and consuming up to a 1/2 liter of vodka or 6 glasses of wine or beers 2 times per week on weekends.    He reported drinking 1-2 glasses of wine on just a few occasions since 3/28/2018.   4/6/2018    (1 glass of wine) None Oral    Marijuana/  Hashish     21 HU: [age], when he reported a pattern of smoking a few one-hit pipes of THC 3-4 times per week on average.   26 None Smoke    Cocaine/Crack       N/A        Meth/  Amphetamines       N/A        Heroin       N/A        Other Opiates/  Synthetics     N/A        Inhalants      N/A        Benzodiazepines       N/A        Hallucinogens       N/A        Barbiturates/  Sedatives/  Hypnotics   N/A        Over-the-Counter Drugs     N/A        Other       N/A        Nicotine       N/A         DIMENSION I - Acute Intoxication / Withdrawal Potential     1. Do you use greater amounts of alcohol/other drugs to feel intoxicated, use greater amounts to achieve the desired effect, or use the same amount and get less of an effect?  (DSM-5: Sx #10)     No    2. Have you ever had an inpatient detoxification admission?  (DSM-5: Sx #11)    No    3. Withdrawal Symptoms:  Within the past year: Within the past 30 days:   Sweating (Rapid Pulse)  Shaky / Jittery / Tremors  Unable to Sleep  Agitation  Headache  Fatigue / Extremely Tired  Irritability  Nausea / Vomiting  Anxiety / Worried   Sweating  (Rapid Pulse)  Shaky / Jittery / Tremors  Unable to Sleep  Agitation  Headache  Fatigue / Extremely Tired  Irritability  Nausea / Vomiting  Anxiety / Worried       4. Is the patient currently exhibiting symptoms of withdrawal?  (DSM-5: Sx #11)    No    5. Based on the above information, does treatment for withdrawal symptoms appear to be a need at this time?  (DSM-5: Sx #11)    No    Dimension I Ratings Summary   Acute intoxication/Withdrawal potential - The placing authority must use the criteria in Dimension I to determine a client s acute intoxication and withdrawal potential.    RISK DESCRIPTIONS - Severity ratin Client displays full functioning with good ability to tolerate and cope with withdrawal discomfort. No signs or symptoms of intoxication or withdrawal or resolving signs or symptoms.    REASONS SEVERITY WAS ASSIGNED (What about the amount of the person s use and date of most recent use and history of withdrawal problems suggests the potential of withdrawal symptoms requiring professional assistance?)     The patient does not appear to have any risk of having significant withdrawal symptoms at this time.         DIMENSION II - Biomedical Complications and Conditions     1. Do you have any current health/medical conditions?(Include any infectious diseases, allergies, chronic or acute pain, history of chronic conditions)       Yes.   Illnesses/Medical Conditions you are receiving care for:   Past Medical History:   Diagnosis Date     ADHD      Anxiety      Childhood asthma      NO ACTIVE PROBLEMS      Seasonal allergies      2. Do you have a health care provider? When was your most recent appointment? What concerns were identified?     No PCP    3. If yes indicated by answers to items 1 or 2: How do you deal with these concerns? Is that working for you? If you are not receiving care for this problem, why not?      Taking medications    4. Please list all of the patient's current medication(s) including  health management, psychotropic, pain management, over-the-counter and/or herbal supplements:     Current Outpatient Prescriptions   Medication     citalopram (CELEXA) 10 MG tablet     No current facility-administered medications for this encounter.      5. When did you last take your medication?     4/10/2018    6. Do you currently self-administer your medications?      Yes    7. Do you follow current medical recommendations/take medications as prescribed?     Yes    8. Has a health care provider/healer ever recommended that you reduce or quit alcohol/drug use?  (DSM-5: Sx #9)    Yes    9. Are you pregnant?     NA, Male    10. Have you had any injuries, assaults/violence towards you, accidents, health related issues, overdose(s) or hospitalizations related to your use of alcohol or other drugs:  (DSM-5: Sx #8 & #9)    No    11. Have you engaged in any risk-taking behavior that would put you at risk for exposure to blood-borne or sexually transmitted diseases?    No    12. Are you on a special diet?    No    13. Do you have any concerns regarding your nutritional status?    No    14. Have you had any appetite changes in the last 3 months?    Yes, explain: increased since he had stopped drinking alcohol.    15. Have you had weight loss or weight gain of more than 10 lbs in the last 3 months?   If patient gained or lost more than 10 lbs, then refer to program RN / attending Physician for assessment.    No    16. Was the patient informed of BMI?  Yes    Above,  General nutrition education    17. Do you have any dental problems?    No    18. Do you have any specific physical needs or disabilities that would need accomodation in a treatment program?     No    Dimension II Ratings Summary   Biomedical Conditions and Complications - The placing authority must use the criteria in Dimension II to determine a client s biomedical conditions and complications.   RISK DESCRIPTIONS - Severity ratin Client displays full  functioning with good ability to cope with physical discomfort.    REASONS SEVERITY WAS ASSIGNED (What physical/medical problems does this person have that would inhibit his or her ability to participate in treatment? What issues does he or she have that require assistance to address?)    The patient reported having a history of the above medical issues, but he reported being medically stable at this time and he denied having any other history of chronic medical problems.  The patient reported taking the above medications, but he denied taking any other prescription or OTC medications at this time.  The patient would benefit from following all of the recommendations of his medical providers.          DIMENSION III - Emotional, Behavioral, Cognitive Conditions and Complications     Childhood Environmental Background     1. Please tell me what it was like growing up in your family. (please include any history of substance abuse, mental health issues, emotional/physical/sexual abuse, forms of discipline, and support)     It was really good.  He reported growing up in middle class family and had a very happy childhood.  He played a lot of sports and felt very supported by his family members.  He denied being aware of any history of abuse issues in his family home.  He reported the form of discipline was having a time-out or having a privilege taken away.  He reported growing up in [place], [place].    Family History   Problem Relation Age of Onset     Prostate Cancer Paternal Grandfather      CANCER Maternal Grandfather      lung cancer     Substance Abuse Other      GAIN Short Screener     2. When was the last time that you had significant problems...  A. with feeling very trapped, lonely, sad, blue, depressed or hopeless  about the future? Past Month    B. with sleep trouble, such as bad dreams, sleeping restlessly, or falling  asleep during the day? 2 - 12 months ago    C. with feeling very anxious, nervous, tense,  scared, panicked, or like  something bad was going to happen? Past Month    D. with becoming very distressed and upset when something reminded  you of the past? Never    E. with thinking about ending your life or committing suicide? Never    3. When was the last time that you did the following things two or more times?  A. Lied or conned to get things you wanted or to avoid having to do  something? Past Month    B. Had a hard time paying attention at school, work, or home? 1+ years ago    C. Had a hard time listening to instructions at school, work, or home? 1+ years ago    D. Were a bully or threatened other people? Never    E. Started physical fights with other people? Never    Note: These questions are from the Global Appraisal of Individual Needs--Short Screener. Any item marked  past month  or  2 to 12 months ago  will be scored with a severity rating of at least 2.     For each item that has occurred in the past month or past year ask follow up questions to determine how often the person has felt this way or has the behavior occurred? How recently? How has it affected their daily living? And, whether they were using or in withdrawal at the time?    4. If the person has answered item 9E with  in the past year  or  the past month , ask about frequency and history of suicide in the family or someone close and whether they were under the influence.     The patient denied having any history of SIB, suicide attempts or suicide ideation.    5. Has anyone close to you, a family member, a friend or a significant other attempted or completed a suicide?     Yes, explain: A cousin who he wasn't very close to, committed suicide.    6. If the person answered item 9E  in the past month  ask about intent, plan, means and access and any other follow-up information to determine imminent risk. Document any actions taken to intervene on any identified imminent risk.      The patient denied having any history of SIB, suicide  attempts or suicide ideation.    PHQ-9, MAGGIE-7 and Suicide Risk Assessment   PHQ-9 on 4/11/2018 MAGGIE-7 on 4/11/2018   The patient's PHQ-9 score was 2 out of 27, indicating minimal depression.     The patient's MAGGIE-7 score was 2 out of 21, indicating minimal anxiety.         Suicide Screening Questions:   Have you wished you were dead or wished you could go to sleep and not wake up?     No   Have you had actual thoughts of killing yourself?     No   When did you have these thoughts?     NA   Do you have any current intent or active desire to take your life?     No   Do you have a plan to take your life?     No   Have you ever made a suicide attempt?     No   Do you have access to pills, guns or other methods to kill yourself?     No     Guide to Risk Ratings   IDEATION: Active thoughts of suicide? INTENT: Intent to follow on suicide? PLAN: Plan to follow through on suicide? Level of Risk:   IF Yes Yes Yes Patient = High Emergent   IF Yes Yes No Patient = High Urgent/Non-Emergent   IF Yes No No Patient = Moderate Non-Urgent   IF No No   No Patient = Low Risk   The patient's ADDITIONAL RISK FACTORS and lack of PROTECTIVE FACTORS may increase their overall suicide risk ratings.     Patient's Responses (within the last 30 days)   IDEATION: Active thoughts of suicide?    No     INTENT: Intent to follow on suicide?    No     PLAN: Plan to follow through on suicide?    No     Determining the level of risk depends on the patient responses, suicide risk factors and protective factors.     Additional Risk Factors:    Significant history of having untreated or poorly treated mental health symptoms   Protective Factors:    Having people in his/her life that would prevent the patient from considering committing suicide (i.e. young children, spouse, parents, etc.)     Having easy access to supportive family members     Having cultural, Gnosticist or spiritual beliefs that discourage suicide     Risk Status   Emergent? No   Urgent /  "Non-Emergent? No   Present / Non- Urgent? No    Low Risk? Yes, Evaluation Counselors - Document in Epic / SBAR to counselor \"No identified risk\" and Treatment Counselors - Assess weekly in progress notes under Dimension 3 and summarize in Discharge / Treatment summary under Dimension 3.   Additional information to support suicide risk rating: See Above     Mental Health History and Mental Health Screening Questions     7. Have you ever been diagnosed with a mental health problem?     Yes, If yes explain: The patient reported having a history of Anxiety D/O NOS, a remote history of ADHD and some symptoms of OCD manifested by double and triple checking locked doors.     8. Have you ever been prescribed medications for mental health issues?    Yes, If yes explain:   Current Outpatient Prescriptions   Medication     citalopram (CELEXA) 10 MG tablet     No current facility-administered medications for this encounter.      9. Have you ever worked with a mental health therapist?    Yes, If yes explain: He reported a remote history of working with a 1:1 mental health therpist when he was in college to help him address his ADHD issues.    10. Do your current mental health providers know about your substance use history and/or about your current substance use?    NA    11. Have you ever had an inpatient mental health hospital admission?    No    12. Have you ever hurt yourself, such as cutting, burning or hitting yourself? No    13. Have you ever been verbally, emotionally, physically or sexually abused?      No    14. Have you lived through any traumatic or stressful life events, such as the death of someone close to you, witnessing violence, being a victim of crime, going through a bad break-up, or any other life event that had caused you significant distress?    Yes, If yes explain: The patient reported having a history of trauma issues regarding having a bad break-up in college with a girlfriend who cheated on him, having " difficulty with his wife getting pregnant, and related to a close friend dying from cancer.  He reported having some grief and loss issues regarding the death of his close friend and the death of one of his friends siblings from a drug overdose.     15. If applicable, have you had any of the following symptoms related to the trauma, abuse or other stressful life events? (dreams, intense memories, flashbacks, physical reactions, etc.)     No    16. If applicable, have you received counseling for trauma or abuse issues?      No    17. Have you ever touched or fondled someone else inappropriately or forced them to have sex with you against their will?    No    18. Have you ever felt obsessed by your sexual behavior, such as having sex with many partners, masturbating often, using pornography often? No    19. Have you ever purged, binged or restricted yourself as a way to control your weight? No    20. Have you ever believed people were spying on you, or that someone was plotting against you or trying to hurt you? No    21. Have you ever believed someone was reading your mind or could hear your thoughts or that you could actually read someone's mind or hear what another person was thinking? No    22. Have you ever believed that someone or some force outside of yourself was putting thoughts into your mind or made you act in a way that was not your usual self?  Have you ever thought you were possessed? No    23. Have you ever believed you were being sent special messages through the TV, radio, newspaper or internet?  No    24. Have you ever heard things other people couldn't hear, such as voices or other noises? No    25. Have you ever had visions when you were awake?  Or have you ever seen things other people couldn't see? No    26. Have you ever had to lie to people important to you about how much you london? No    27. Have you ever felt the need to bet more and more money? No    28. Have you ever attempted treatment  for a gambling problem? No    29. Highest grade of school completed:  College graduate    30. Do you have any difficulties with reading, writing or calculating?  Yes, If yes explain: slow reader.    31. Have you ever been diagnosed with a learning disability, such as ADHD or dyslexia?  Yes, If yes explain: History of ADHD.    32. What is your preferred learning style?  by hands-on practice and by watching someone else demonstrate    33. Do you have any problems with memory impairment or problem solving?  No    34. Do you have any problems with headaches or dizziness? No    35. Have you ever been in the ?  No    36. Have you been diagnosed with traumatic brain injury or Alzheimer s?  No    37. Have you ever hit your head or been hit on the head?  Yes    38. Have you ever had medical treatment for an injury to your head?  Yes, If yes explain: He had a concussion when he rolled out of bed and hit his temple on the nightstand.    39. Have you had any significant illness that affected your brain (brain tumor, meningitis, West Nile Virus, stroke, seizure, heart attack, near drowning or near suffocation)?  No    40. Have you ever been diagnosed with Fetal Alcohol Effects or Fetal Alcohol Syndrome?  No    41. What are your some of your personal strengths?  Good social skills, empathetic and a good memory.    Dimension III Ratings Summary   Emotional/Behavioral/Cognitive - The placing authority must use the criteria in Dimension III to determine a client s emotional, behavioral, and cognitive conditions and complications.   RISK DESCRIPTIONS - Severity ratin Client has difficulty with impulse control and lacks coping skills. Client has thoughts of suicide or harm to others without means; however, the thoughts may interfere with participation in some treatment activities. Client has difficulty functioning in significant life areas. Client has moderate symptoms of emotional, behavioral, or cognitive problems.  Client is able to participate in most treatment activities.    REASONS SEVERITY WAS ASSIGNED - What current issues might with thinking, feelings or behavior pose barriers to participation in a treatment program? What coping skills or other assets does the person have to offset those issues? Are these problems that can be initially accommodated by a treatment provider? If not, what specialized skills or attributes must a provider have?    The patient reported having a history of Anxiety D/O NOS, a remote history of ADHD and some symptoms of OCD manifested by double and triple checking locked doors.  The patient has difficulty with impulse control and he lacks sober coping skills.  The patient reported having a history of trauma issues regarding having a bad break-up in college with a girlfriend who cheated on him, having difficulty with his wife getting pregnant, and related to a close friend dying from cancer.  He reported having some grief and loss issues regarding the death of his close friend and the death of one of his friends siblings from a drug overdose.  The patient denied having any history of SIB, suicide attempts or suicide ideation.  The patient would benefit from having an outpatient mental health evaluation to address his current clinical mental health issues and from following all of the recommendations of his mental health providers.       DIMENSION IV - Readiness to Change     1. What is your motivation for participating in this evaluation today?    The patient reported having a history of trauma issues regarding having a bad break-up in college with a girlfriend who cheated on him, having difficulty with his wife getting pregnant, and related to a close friend dying from cancer.  He reported having some grief and loss issues regarding the death of his close friend and the death of one of his friends siblings from a drug overdose.     2. What problematic behaviors have you engaged in when using  alcohol or other drugs that could be hazardous to you or others (i.e. driving a car/motorcycle/boat, operating machinery, unsafe sex, IV drug use, sharing needles, etc.)  (DSM-5: Sx #8)    The patient reported having a history of driving while under the influnece of alcohol or drugs.    3. If applicable, when did you first think you had a problem with your alcohol or other drug use?    On and off over the past year.    4. Who in your life has shared concerns with you about your use of alcohol or other drugs?    His wife has shared concerns with him over the past few years.  He brother had also shared concern with him about his use of alcohol in the past.    5. Are there any changes you have made or plan to make regarding how you had been using alcohol or other drugs?    His plan is to stop his daily use of alcohol which he had done over the past two weeks, but he would still like to be able to drink alcohol in a minimal and social fashion in social situations and to not continue drinking alcohol alone in his home as he had been doing over the past year.    Dimension IV Ratings Summary   Readiness for Change - The placing authority must use the criteria in Dimension IV to determine a client s readiness for change.   RISK DESCRIPTIONS - Severity ratin Client is motivated with active reinforcement, to explore treatment and strategies for change, but ambivalent about illness or need for change.    REASONS SEVERITY WAS ASSIGNED - (What information did the person provide that supports your assessment of his or her readiness to change? How aware is the person of problems caused by continued use? How willing is she or he to make changes? What does the person feel would be helpful? What has the person been able to do without help?)      The patient displayed verbal compliance to abstain from alcohol and all other non-prescribed mood altering chemicals for a limited period of time, but he had lacked consistent behavior to  support abstinence, including continuing to drink alcohol on a few occasions since his 3/28/2018 Emergency Department visit.  The patient was open to considering working with a 1:1 mental health therapist, but he was not interested in entering a CD treatment program at any level of care at the time of this documentation.       DIMENSION V - Relapse, Continued Use and Continued Problem Potential     1. If you have had previous periods of sobriety, when was your longest period of sobriety and what were you doing at that time that was supporting your sobriety?  (DSM-5: Sx #2)    He had been drinking alcohol daily over the past few years and he hadn't really made an effort to stop his use of alcohol until the past 2 weeks.    2. Within the past 30 days, on a scale from 0-10 (0 = having no cravings at all and 10 = having very strong cravings to use alcohol or other drugs) what number would you assign to your cravings? (DSM-5: Sx #4)     4    3. Can you identify any specific reasons or specific triggers that contribute to you being more likely to consume alcohol or other drugs? (DSM-5: Sx #4)    Yes, explain: Drinking to self-medicate his anxiety and/or stress.    4. Have you been treated for alcohol/other substance use disorder? (DSM-5: Sx #2)    No    5. Support group participation: Have you/do you attend 12-step or other support group meetings? How recently? What was your experience? Are you willing to restart? If the person has not participated, is he or she willing?  (DSM-5: Sx #2)    The patient denied having any history of attending 12-step or other support group meetings.    6. Do you drink alcohol or use other drugs in larger amounts than intended or over a longer period of time than was intended?  (DSM-5: Sx #1)    Yes    7. Do you spend a great deal of time engaged in activities necessary to obtain alcohol or other drugs, a great deal of time using alcohol or other drugs, or a great deal of time recovering from  alcohol or other drug use?  (DSM-5: Sx #3)    Yes, explain: drinking on a daily basis.    Dimension V Ratings Summary   Relapse/Continued Use/Continued problem potential - The placing authority must use the criteria in Dimension V to determine a client s relapse, continued use, and continued problem potential.   RISK DESCRIPTIONS - Severity ratin (A) Client has minimal recognition and understanding of relapse and recidivism issues and displays moderate vulnerability for further substance use or mental health problems. (B) Client has some coping skills inconsistently applied.    REASONS SEVERITY WAS ASSIGNED - (What information did the person provide that indicates his or her understanding of relapse issues? What about the person s experience indicates how prone he or she is to relapse? What coping skills does the person have that decrease relapse potential?)      The patient had continued to abuse mood altering chemicals despite having negative consequences in many life areas, including having relationship conflict with his wife and having decreased performance at work.  The patient would benefit from developing sober coping skills and long-term sober maintenance skills.  The patient has dual issues of MI and CD.  The patient has a tendency to isolate when he is drinking alcohol and he lacks a sober peer support network.       DIMENSION VI - Recovery Environment     1. Are you employed or attending school?    The patient reported working full-time as a .  He reported having some decreased performance at work secondary to his daily use of alcohol.     2. If working or a student, are you able to function appropriately in that setting?     Yes    3. Has your job and/or school work been negatively impacted by your use of alcohol of other drugs?  (DSM-5: Sx #5 & Sx #7)    Yes, If yes explain: He reported having some decreased performance at work secondary to his daily use of alcohol.     4. How would  you describe your current finances?  Doing well     5. Are you having financial problems, such as money being tight, living paycheck to paycheck, having unpaid or late bills, having significant debt, a history of bankruptcy, or IRS problems?    No     6. Describe a typical day; evening for you. Work, school, social, leisure activities, volunteer, exercise, spiritual practices or other daily tasks.    Head to work at 5 am, clients from 6 am to 4 pm or 6 pm.  Head home and drink a glass of vodka when he got home, have dinner and drink some later in the evening.    7. Have you reduced or discontinued recreational activities, hobbies or other leisure activities as a result of your use of alcohol or other drugs?  (DSM-5: Sx #7)    Yes, If yes explain: less time with his son due to his use of alcohol.    8. Who do you live with?      The patient lives with his wife and their 19-month old son in single family home.      9. Are there any people in the home who have current substance abuse issues or have mental health issues?     He reported his wife doesn't have any current substance abuse issues and she is very supportive of him stopping his use of alcohol and all other non-prescribed mood altering chemicals.     10. Tell me about your living environment/neighborhood? Ask enough follow up questions to determine safety, criminal activity, availability of alcohol and drugs, supportive or antagonistic to the person making changes.      The patient denied having any concerns regarding his immediate living environment and/or neighborhood.     11. Are you concerned for your safety or anyone else's safety in the home? No    12. Do you have plans to move somewhere else or change your living environment in any manner?    No    13. Do you have children who live with you?     Yes.  (Ask follow-up questions to determine the person s relationship and responsibility, both legal and care giving; and what arrangements are made for  supervision for the children when the person is not available.) 19-month old son    14. Do you have children who do not live with you?     No    15. Do you have any history of being involved with Child Protection Services? No     16. Are you currently in a significant relationship?     Yes, if yes:  How long have you been in the relationship?  14 years    17. How do you identify your sexual orientation?    Heterosexual    18. The patient reported: .    19. Does your significant other have a history of substance abuse or have current substance abuse issues?    No    20. How important is substance use to your social connections? Do many of your family or friends use?     Most of his family members, friends and co-workers drink alcohol.    21. Who in your life would you consider to be your primary support network at this time?    His wife and his other family members.    22. Have any of your relationships (S.O., family members, friends, employers, teachers, etc.) been negatively impacted by your use of alcohol or other drugs?  (DSM-5: Sx #6)    Yes, If yes explain: The patient reported having significant relationship conflict with his wife due to his alcohol abuse.    23. Do you currently participate in community keerthi activities, such as attending Synagogue, temple, Faith or Nondenominational services?  Yes, If yes explain: Casual attendance at Synagogue.    24. Criminal justice history: Gather current/recent history and any significant history related to substance use--Arrests? Convictions? Circumstances? Alcohol or drug involvement? Sentences? Still on probation or parole? Expectations of the court? Current court order?  (DSM-5: Sx #8)    The patient reported having 1 DWI charge in 2015, but he denied having any other history of legal issues.     25. Are you or have you ever been a registered sex offender? No    26. Do you have a child protection worker,  or ? No    27. Are you currently on  any type of commitment? No, I'm not on any type of commitment at this time.    28. Do you have a valid 's license? Yes    29. What obstacles exist to participating in treatment? (Time off work, childcare, funding, transportation, pending residential time, living situation)     NA    Dimension VI Ratings Summary   Recovery environment - The placing authority must use the criteria in Dimension VI to determine a client s recovery environment.   RISK DESCRIPTIONS - Severity ratin Client has passive social  or family and significant other are not interested in the client s recovery. The client is engaged in structured meaningful activity.    REASONS SEVERITY WAS ASSIGNED - (What support does the person have for making changes? What structure/stability does the person have in his or her daily life that will increase the likelihood that changes can be sustained? What problems exist in the person s environment that will jeopardize getting/staying clean and sober?)     The patient lives with his wife and their 19-month old son in single family home.  He reported his wife doesn't have any current substance abuse issues and she is very supportive of him stopping his use of alcohol and all other non-prescribed mood altering chemicals.  The patient reported having significant relationship conflict with his wife due to his alcohol abuse.  The patient denied having any concerns regarding his immediate living environment and/or neighborhood.  The patient has a tendency to isolate when he is drinking alcohol and he lacks a sober peer support network.  The patient reported having 1 DWI charge in , but he denied having any other history of legal issues.  The patient reported that his use of alcohol had been done both alone at home at night and also with others in social situations.  The patient reported working full-time as a .  He reported having some decreased performance at work secondary to  his daily use of alcohol.  The patient denied reported doing well financially at this time.       Mental Health Status   Physical Appearance/Attire: Appears stated age   Hygiene: well groomed   Eye Contact: at examiner   Speech Rate:  regular   Speech Volume: regular   Speech Quality: fluid   Cognitive/Perceptual:  reality based   Cognition: memory intact    Judgment: intact   Insight: intact   Orientation:  time, place, person and situation   Thought:   logical    Hallucinations:  none   General Behavioral Tone: cooperative   Psychomotor Activity: no problem noted   Gait:  no problem   Mood: appropriate   Affect: congruence/appropriate   Counselor Notes: NA     Patient Choices/Exceptions     Would you like services specific to language, age, gender, culture, Shinto preference, race, ethnicity, sexual orientation or disability?  No    What particular treatment choices and options would you like to have? NA    Do you have a preference for a particular treatment program? NA    Patient is willing to follow treatment recommendations.  NA    DSM-5 Criteria for Substance Use Disorder   Criteria for Diagnosis  Instructions: Determine whether the client currently meets the criteria for Substance Use Disorder using the diagnostic criteria in the DSM-5 pp.[id number]. Current means during the most recent 12 months outside a facility that controls access to substances.    A problematic pattern of alcohol/drug use leading to clinically significant impairment or distress, as manifested by at least two of the following, occurring within a 12-month period:    3. A great deal of time is spent in activities necessary to obtain alcohol/drug, use alcohol/drug, or recover from its effects.  6. Continued alcohol use despite having persistent or recurrent social or interpersonal problems caused or exacerbated by the effects of alcohol/drug.  9. Alcohol/drug use is continued despite knowledge of having a persistent or recurrent physical or  FAMILY HISTORY:  Father  Still living? Unknown  Family history of diabetes mellitus, Age at diagnosis: Age Unknown     psychological problem that is likely to have been caused or exacerbated by alcohol.  10. Tolerance, as defined by either of the following: A need for markedly increased amounts of alcohol/drug to achieve intoxication or desired effect.  11. Withdrawal, as manifested by either of the following: The characteristic withdrawal syndrome for alcohol/drug (refer to Criteria A and B of the criteria set for alcohol/drug withdrawal).          Specify if: In early remission:  After full criteria for alcohol/drug use disorder were previously met, none of the criteria for alcohol/drug use disorder have been met for at least 3 months but for less than 12 months (with the exception that Criterion A4,  Craving or a strong desire or urge to use alcohol/drug  may be met).     In sustained remission:   After full criteria for alcohol use disorder were previously met, non of the criteria for alcohol/drug use disorder have been met at any time during a period of 12 months or longer (with the exception that Criterion A4,  Craving or strong desire or urge to use alcohol/drug  may be met).   Specify if:   This additional specifier is used if the individual is in an environment where access to alcohol is restricted.    Mild: Presence of 2-3 symptoms  Moderate: Presence of 4-5 symptoms  Severe: Presence of 6 or more symptoms    DSM-5 Substance Use Disorder Diagnosis     Alcohol Use Disorder Moderate - 303.90 (F10.20)    Collateral Contact Summary     Number of contacts made:  1    Contact with referring person:  No    If court related records were reviewed, summarize here:  No    Information from collateral contacts supported/largely agreed with information from the client and associated risk ratings.    Collateral Contact      Name: Relationship: Phone number: Releases:   Electronic medical records at [facility] NA NA No     The patient's Electronic medical records at [facility] were reviewed and the information contained in the medical records  supported the patient's account of his chemical use history and his chemical use consequences.      Collateral Contact      Name: Relationship: Phone number: Releases:   Ashley Gloria Wife [phone] Yes     Collateral data had not yet been obtained from this contact at the time of this documentation.      Recommendations     1.)  It was recommended the patient abstain from alcohol and all other non-prescribed mood altering chemicals for a minimum of a 6-month period of time in an attempt to make a determination about his ability to stop his use of alcohol.   2.)  Have an outpatient mental health evaluation to address his current clinical mental health issues.  3.)  Follow all of the recommendations of his medical and mental health providers.  4.)  Consider seeing a 1:1 mental health therapist to work on developing stress management skills and healthy coping skills.  5.)  Consider attending 12-step or other support group meetings as needed to support abstinence.  6.)  If the patient were unable to maintain abstinence from alcohol and all other non-prescribed mood altering chemicals over the next 6-months, he would benefit from having an updated substance abuse assessment and possibly from being referred to an intensive outpatient primary CD or MI/CD treatment program.    ADDENDUM ON 5/4/2018:  Talked with the patient on 5/4/2018 at 2:20 pm and we discussed how things had been going since he had participated in the substance abuse assessment with this counselor on 4/11/2018.  The patient reported that he had not consumed any alcohol since 4/6/2018 and his plan was to continue to abstain from alcohol at this time.  The patient described things as being great since the substance abuse assessment and he reported being in a much better place today than he was on the day of the assessment.  The patient reported since he had stopped his use of alcohol his relationship with his wife had improved, he was spending more  time with supportive friends, his mental health issues had improved and his performance at work had vastly improved.  The patient stated he was in the process of finding a 1:1 mental health therapist to work with on at least a monthly basis for ongoing support and to further develop stress management skills and healthy coping skills.        Level of Care   I.) Intoxication and Withdrawal: 0   II.) Biomedical:  0   III.) Emotional and Behavioral:  2   IV.) Readiness to Change:  1   V.) Relapse Potential: 2   VI.) Recovery Environmental: 1     Initial Problem List     The patient has poor coping skills  The patient has dual issues of MI and CD  The patient lacks the ability to effectively manage his/her mental health issues  The patient has a significant history of trauma and/or abuse issues

## 2022-01-31 ENCOUNTER — MYC REFILL (OUTPATIENT)
Dept: INTERNAL MEDICINE | Facility: CLINIC | Age: 42
End: 2022-01-31

## 2022-01-31 DIAGNOSIS — F90.2 ATTENTION DEFICIT HYPERACTIVITY DISORDER (ADHD), COMBINED TYPE: ICD-10-CM

## 2022-02-01 NOTE — TELEPHONE ENCOUNTER
Routing refill request to provider for review/approval because:  Drug not on the [facility] refill protocol   amphetamine-dextroamphetamine (ADDERALL XR) 20 MG 24 hr capsule 30 capsule 0 1/5/2022  No   Sig - Route: Take 1 capsule (20 mg) by mouth daily - Oral   LAST OV-11/3/21

## 2022-02-03 RX ORDER — DEXTROAMPHETAMINE SACCHARATE, AMPHETAMINE ASPARTATE MONOHYDRATE, DEXTROAMPHETAMINE SULFATE AND AMPHETAMINE SULFATE 5; 5; 5; 5 MG/1; MG/1; MG/1; MG/1
20 CAPSULE, EXTENDED RELEASE ORAL DAILY
Qty: 30 CAPSULE | Refills: 0 | Status: SHIPPED | OUTPATIENT
Start: 2022-02-03 | End: 2022-03-01

## 2022-03-01 ENCOUNTER — MYC REFILL (OUTPATIENT)
Dept: INTERNAL MEDICINE | Facility: CLINIC | Age: 42
End: 2022-03-01

## 2022-03-01 DIAGNOSIS — F90.2 ATTENTION DEFICIT HYPERACTIVITY DISORDER (ADHD), COMBINED TYPE: ICD-10-CM

## 2022-03-03 NOTE — TELEPHONE ENCOUNTER
Routing refill request to provider for review/approval because:  Controlled substance request    Last Written Prescription Date:  2/3/22  Last Fill Quantity: 30,  # refills: 0   Last office visit provider:  11/3/21     Requested Prescriptions   Pending Prescriptions Disp Refills     amphetamine-dextroamphetamine (ADDERALL XR) 20 MG 24 hr capsule 30 capsule 0     Sig: Take 1 capsule (20 mg) by mouth daily       There is no refill protocol information for this order          Mauricio Gallardo RN 03/03/22 7:17 AM

## 2022-03-04 RX ORDER — DEXTROAMPHETAMINE SACCHARATE, AMPHETAMINE ASPARTATE MONOHYDRATE, DEXTROAMPHETAMINE SULFATE AND AMPHETAMINE SULFATE 5; 5; 5; 5 MG/1; MG/1; MG/1; MG/1
20 CAPSULE, EXTENDED RELEASE ORAL DAILY
Qty: 30 CAPSULE | Refills: 0 | Status: SHIPPED | OUTPATIENT
Start: 2022-03-04 | End: 2022-04-02

## 2022-04-02 ENCOUNTER — MYC REFILL (OUTPATIENT)
Dept: INTERNAL MEDICINE | Facility: CLINIC | Age: 42
End: 2022-04-02

## 2022-04-02 DIAGNOSIS — F90.2 ATTENTION DEFICIT HYPERACTIVITY DISORDER (ADHD), COMBINED TYPE: ICD-10-CM

## 2022-04-03 NOTE — TELEPHONE ENCOUNTER
Routing refill request to provider for review/approval because:  Drug not on the [facility] refill protocol Controlled substance    Last Written Prescription Date:  03/04/2022  Last Fill Quantity: 30,  # refills: 0   Last office visit provider:  11/03/2022       Requested Prescriptions   Pending Prescriptions Disp Refills     amphetamine-dextroamphetamine (ADDERALL XR) 20 MG 24 hr capsule 30 capsule 0     Sig: Take 1 capsule (20 mg) by mouth daily       There is no refill protocol information for this order          Thania Jimenez LPN 04/03/22 4:18 PM

## 2022-04-04 RX ORDER — DEXTROAMPHETAMINE SACCHARATE, AMPHETAMINE ASPARTATE MONOHYDRATE, DEXTROAMPHETAMINE SULFATE AND AMPHETAMINE SULFATE 5; 5; 5; 5 MG/1; MG/1; MG/1; MG/1
20 CAPSULE, EXTENDED RELEASE ORAL DAILY
Qty: 30 CAPSULE | Refills: 0 | Status: SHIPPED | OUTPATIENT
Start: 2022-04-04 | End: 2022-05-01

## 2022-05-01 ENCOUNTER — MYC REFILL (OUTPATIENT)
Dept: INTERNAL MEDICINE | Facility: CLINIC | Age: 42
End: 2022-05-01

## 2022-05-01 DIAGNOSIS — F90.2 ATTENTION DEFICIT HYPERACTIVITY DISORDER (ADHD), COMBINED TYPE: ICD-10-CM

## 2022-05-04 RX ORDER — DEXTROAMPHETAMINE SACCHARATE, AMPHETAMINE ASPARTATE MONOHYDRATE, DEXTROAMPHETAMINE SULFATE AND AMPHETAMINE SULFATE 5; 5; 5; 5 MG/1; MG/1; MG/1; MG/1
20 CAPSULE, EXTENDED RELEASE ORAL DAILY
Qty: 30 CAPSULE | Refills: 0 | Status: SHIPPED | OUTPATIENT
Start: 2022-05-04 | End: 2022-06-01

## 2022-05-04 NOTE — TELEPHONE ENCOUNTER
Routing refill request to provider for review/approval because:  Controlled substance request    Last Written Prescription Date:  4/4/22  Last Fill Quantity: 30,  # refills: 0   Last office visit provider:  11/3/21     Requested Prescriptions   Pending Prescriptions Disp Refills     amphetamine-dextroamphetamine (ADDERALL XR) 20 MG 24 hr capsule 30 capsule 0     Sig: Take 1 capsule (20 mg) by mouth daily       There is no refill protocol information for this order          Mauricio Gallardo RN 05/04/22 8:34 AM

## 2022-06-01 ENCOUNTER — MYC REFILL (OUTPATIENT)
Dept: INTERNAL MEDICINE | Facility: CLINIC | Age: 42
End: 2022-06-01

## 2022-06-01 DIAGNOSIS — F90.2 ATTENTION DEFICIT HYPERACTIVITY DISORDER (ADHD), COMBINED TYPE: ICD-10-CM

## 2022-06-03 RX ORDER — DEXTROAMPHETAMINE SACCHARATE, AMPHETAMINE ASPARTATE MONOHYDRATE, DEXTROAMPHETAMINE SULFATE AND AMPHETAMINE SULFATE 5; 5; 5; 5 MG/1; MG/1; MG/1; MG/1
20 CAPSULE, EXTENDED RELEASE ORAL DAILY
Qty: 30 CAPSULE | Refills: 0 | Status: SHIPPED | OUTPATIENT
Start: 2022-06-03 | End: 2022-06-30

## 2022-06-03 NOTE — TELEPHONE ENCOUNTER
Routing refill request to provider for review/approval because:  Drug not on the [facility] refill protocol     Last Written Prescription Date:  5/4/22  Last Fill Quantity: 30,  # refills: 0   Last office visit provider:  11/3/21     Requested Prescriptions   Pending Prescriptions Disp Refills     amphetamine-dextroamphetamine (ADDERALL XR) 20 MG 24 hr capsule 30 capsule 0     Sig: Take 1 capsule (20 mg) by mouth daily       There is no refill protocol information for this order          Susan Purcell RN 06/02/22 7:05 PM

## 2022-06-30 ENCOUNTER — MYC REFILL (OUTPATIENT)
Dept: INTERNAL MEDICINE | Facility: CLINIC | Age: 42
End: 2022-06-30

## 2022-06-30 DIAGNOSIS — F90.2 ATTENTION DEFICIT HYPERACTIVITY DISORDER (ADHD), COMBINED TYPE: ICD-10-CM

## 2022-07-01 RX ORDER — DEXTROAMPHETAMINE SACCHARATE, AMPHETAMINE ASPARTATE MONOHYDRATE, DEXTROAMPHETAMINE SULFATE AND AMPHETAMINE SULFATE 5; 5; 5; 5 MG/1; MG/1; MG/1; MG/1
20 CAPSULE, EXTENDED RELEASE ORAL DAILY
Qty: 30 CAPSULE | Refills: 0 | Status: SHIPPED | OUTPATIENT
Start: 2022-07-01 | End: 2022-07-27

## 2022-07-27 ENCOUNTER — VIRTUAL VISIT (OUTPATIENT)
Dept: INTERNAL MEDICINE | Facility: CLINIC | Age: 42
End: 2022-07-27
Payer: COMMERCIAL

## 2022-07-27 DIAGNOSIS — F90.2 ATTENTION DEFICIT HYPERACTIVITY DISORDER (ADHD), COMBINED TYPE: Primary | ICD-10-CM

## 2022-07-27 PROCEDURE — 99213 OFFICE O/P EST LOW 20 MIN: CPT | Mod: 95 | Performed by: NURSE PRACTITIONER

## 2022-07-27 RX ORDER — DEXTROAMPHETAMINE SACCHARATE, AMPHETAMINE ASPARTATE MONOHYDRATE, DEXTROAMPHETAMINE SULFATE AND AMPHETAMINE SULFATE 5; 5; 5; 5 MG/1; MG/1; MG/1; MG/1
20 CAPSULE, EXTENDED RELEASE ORAL DAILY
Qty: 30 CAPSULE | Refills: 0 | Status: SHIPPED | OUTPATIENT
Start: 2022-08-27 | End: 2022-09-26

## 2022-07-27 RX ORDER — DEXTROAMPHETAMINE SACCHARATE, AMPHETAMINE ASPARTATE MONOHYDRATE, DEXTROAMPHETAMINE SULFATE AND AMPHETAMINE SULFATE 5; 5; 5; 5 MG/1; MG/1; MG/1; MG/1
20 CAPSULE, EXTENDED RELEASE ORAL DAILY
Qty: 30 CAPSULE | Refills: 0 | Status: SHIPPED | OUTPATIENT
Start: 2022-07-27 | End: 2022-08-26

## 2022-07-27 RX ORDER — DEXTROAMPHETAMINE SACCHARATE, AMPHETAMINE ASPARTATE MONOHYDRATE, DEXTROAMPHETAMINE SULFATE AND AMPHETAMINE SULFATE 5; 5; 5; 5 MG/1; MG/1; MG/1; MG/1
20 CAPSULE, EXTENDED RELEASE ORAL DAILY
Qty: 30 CAPSULE | Refills: 0 | Status: SHIPPED | OUTPATIENT
Start: 2022-09-27 | End: 2022-10-27

## 2022-07-27 NOTE — PROGRESS NOTES
Herminio is a 41 year old who is being evaluated via a billable video visit.      How would you like to obtain your AVS? MyChart  If the video visit is dropped, the invitation should be resent by: Text to cell phone: [phone]  Will anyone else be joining your video visit? No          Assessment & Plan     Attention deficit hyperactivity disorder (ADHD), combined type: Will fill his adderall for a three month course. He will find a new provider covered under his new insurance. If he needs additional medication before establishing, will also fill this, he has been compliant with this adderall use.   - amphetamine-dextroamphetamine (ADDERALL XR) 20 MG 24 hr capsule  Dispense: 30 capsule; Refill: 0  - amphetamine-dextroamphetamine (ADDERALL XR) 20 MG 24 hr capsule  Dispense: 30 capsule; Refill: 0  - amphetamine-dextroamphetamine (ADDERALL XR) 20 MG 24 hr capsule  Dispense: 30 capsule; Refill: 0    Return if symptoms worsen or fail to improve.    Carmina Garner [facility]   Herminio is a 41 year old presenting for the following health issues:  Med Refill  (This is my final visit as our insurance is switching systems at the end of July. My only concern is having refill access while we transition to a new system and primary care. Thank you!)      HPI     The patient presents today for a refill of his Adderall. He will have new insurance starting in August, and will be establishing with a new provider. He will need his adderall filled to bridge him to this appointment.    He denies other concerns today.    He has been very complaint with his adderall use.    Review of Systems   Constitutional, HEENT, cardiovascular, pulmonary, gi and gu systems are negative, except as otherwise noted.      Objective           Vitals:  No vitals were obtained today due to virtual visit.    Physical Exam   GENERAL: Healthy, alert and no distress  EYES: Eyes grossly normal to  inspection.  No discharge or erythema, or obvious scleral/conjunctival abnormalities.  RESP: No audible wheeze, cough, or visible cyanosis.  No visible retractions or increased work of breathing.    SKIN: Visible skin clear. No significant rash, abnormal pigmentation or lesions.  NEURO: Cranial nerves grossly intact.  Mentation and speech appropriate for age.  PSYCH: Mentation appears normal, affect normal/bright, judgement and insight intact, normal speech and appearance well-groomed.      Video-Visit Details    Video Start Time: 11:59am    Type of service:  Video Visit    Video End Time:12:06 PM    Originating Location (pt. Location): Home    Distant Location (provider location):  [facility]     Platform used for Video Visit: [email or web address]    .  ..  Answers for HPI/ROS submitted by the patient on 7/27/2022  What is the reason for your visit today? : Med check in before changing health care systems.  How many servings of fruits and vegetables do you eat daily?: 2-3  On average, how many sweetened beverages do you drink each day (Examples: soda, juice, sweet tea, etc.  Do NOT count diet or artificially sweetened beverages)?: 1  How many minutes a day do you exercise enough to make your heart beat faster?: 30 to 60  How many days a week do you exercise enough to make your heart beat faster?: 5  How many days per week do you miss taking your medication?: 0

## 2022-08-06 ENCOUNTER — HEALTH MAINTENANCE LETTER (OUTPATIENT)
Age: 42
End: 2022-08-06

## 2022-10-16 ENCOUNTER — HEALTH MAINTENANCE LETTER (OUTPATIENT)
Age: 42
End: 2022-10-16

## 2023-08-26 ENCOUNTER — HEALTH MAINTENANCE LETTER (OUTPATIENT)
Age: 43
End: 2023-08-26

## 2024-10-19 ENCOUNTER — HEALTH MAINTENANCE LETTER (OUTPATIENT)
Age: 44
End: 2024-10-19